# Patient Record
Sex: MALE | Race: WHITE | Employment: FULL TIME | URBAN - METROPOLITAN AREA
[De-identification: names, ages, dates, MRNs, and addresses within clinical notes are randomized per-mention and may not be internally consistent; named-entity substitution may affect disease eponyms.]

---

## 2021-11-01 ENCOUNTER — APPOINTMENT (OUTPATIENT)
Dept: CT IMAGING | Age: 51
End: 2021-11-01
Attending: EMERGENCY MEDICINE
Payer: COMMERCIAL

## 2021-11-01 ENCOUNTER — HOSPITAL ENCOUNTER (OUTPATIENT)
Age: 51
Setting detail: OBSERVATION
Discharge: HOME | End: 2021-11-03
Attending: EMERGENCY MEDICINE | Admitting: INTERNAL MEDICINE
Payer: COMMERCIAL

## 2021-11-01 DIAGNOSIS — I95.9 HYPOTENSION, UNSPECIFIED HYPOTENSION TYPE: ICD-10-CM

## 2021-11-01 DIAGNOSIS — A41.9 SEPSIS, DUE TO UNSPECIFIED ORGANISM, UNSPECIFIED WHETHER ACUTE ORGAN DYSFUNCTION PRESENT (HCC): ICD-10-CM

## 2021-11-01 DIAGNOSIS — K52.9 ENTERITIS: Primary | ICD-10-CM

## 2021-11-01 PROBLEM — R42 LIGHTHEADEDNESS: Status: ACTIVE | Noted: 2021-11-01

## 2021-11-01 PROBLEM — R19.7 DIARRHEA: Status: ACTIVE | Noted: 2021-11-01

## 2021-11-01 PROBLEM — R10.9 ACUTE ABDOMINAL PAIN: Status: ACTIVE | Noted: 2021-11-01

## 2021-11-01 PROBLEM — D72.829 LEUKOCYTOSIS: Status: ACTIVE | Noted: 2021-11-01

## 2021-11-01 PROBLEM — R00.0 TACHYCARDIA: Status: ACTIVE | Noted: 2021-11-01

## 2021-11-01 LAB
ABO + RH BLD: NORMAL
ALBUMIN SERPL-MCNC: 3.7 G/DL (ref 3.5–5)
ALBUMIN SERPL-MCNC: 3.8 G/DL (ref 3.5–5)
ALBUMIN/GLOB SERPL: 1.1 {RATIO} (ref 1.1–2.2)
ALBUMIN/GLOB SERPL: 1.2 {RATIO} (ref 1.1–2.2)
ALP SERPL-CCNC: 51 U/L (ref 45–117)
ALP SERPL-CCNC: 53 U/L (ref 45–117)
ALT SERPL-CCNC: 32 U/L (ref 12–78)
ALT SERPL-CCNC: 32 U/L (ref 12–78)
AMPHET UR QL SCN: NEGATIVE
ANION GAP SERPL CALC-SCNC: 9 MMOL/L (ref 5–15)
APPEARANCE UR: CLEAR
AST SERPL-CCNC: 14 U/L (ref 15–37)
AST SERPL-CCNC: 19 U/L (ref 15–37)
ATRIAL RATE: 88 BPM
BACTERIA URNS QL MICRO: NEGATIVE /HPF
BARBITURATES UR QL SCN: NEGATIVE
BASOPHILS # BLD: 0 K/UL (ref 0–0.1)
BASOPHILS NFR BLD: 0 % (ref 0–1)
BENZODIAZ UR QL: NEGATIVE
BILIRUB DIRECT SERPL-MCNC: 0.2 MG/DL (ref 0–0.2)
BILIRUB SERPL-MCNC: 0.5 MG/DL (ref 0.2–1)
BILIRUB SERPL-MCNC: 0.6 MG/DL (ref 0.2–1)
BILIRUB UR QL: NEGATIVE
BLOOD GROUP ANTIBODIES SERPL: NORMAL
BUN SERPL-MCNC: 16 MG/DL (ref 6–20)
BUN/CREAT SERPL: 8 (ref 12–20)
CALCIUM SERPL-MCNC: 9 MG/DL (ref 8.5–10.1)
CALCULATED P AXIS, ECG09: 67 DEGREES
CALCULATED R AXIS, ECG10: 56 DEGREES
CALCULATED T AXIS, ECG11: 74 DEGREES
CANNABINOIDS UR QL SCN: NEGATIVE
CHLORIDE SERPL-SCNC: 102 MMOL/L (ref 97–108)
CO2 SERPL-SCNC: 24 MMOL/L (ref 21–32)
COCAINE UR QL SCN: NEGATIVE
COLOR UR: ABNORMAL
CREAT SERPL-MCNC: 1.9 MG/DL (ref 0.7–1.3)
DIAGNOSIS, 93000: NORMAL
DIFFERENTIAL METHOD BLD: ABNORMAL
DRUG SCRN COMMENT,DRGCM: NORMAL
EOSINOPHIL # BLD: 0 K/UL (ref 0–0.4)
EOSINOPHIL NFR BLD: 0 % (ref 0–7)
EPITH CASTS URNS QL MICRO: ABNORMAL /LPF
ERYTHROCYTE [DISTWIDTH] IN BLOOD BY AUTOMATED COUNT: 13 % (ref 11.5–14.5)
ETHANOL SERPL-MCNC: <10 MG/DL
GLOBULIN SER CALC-MCNC: 3.2 G/DL (ref 2–4)
GLOBULIN SER CALC-MCNC: 3.3 G/DL (ref 2–4)
GLUCOSE SERPL-MCNC: 153 MG/DL (ref 65–100)
GLUCOSE UR STRIP.AUTO-MCNC: NEGATIVE MG/DL
HCT VFR BLD AUTO: 46.2 % (ref 36.6–50.3)
HEMOCCULT STL QL: NEGATIVE
HGB BLD-MCNC: 15.6 G/DL (ref 12.1–17)
HGB UR QL STRIP: NEGATIVE
IMM GRANULOCYTES # BLD AUTO: 0.2 K/UL (ref 0–0.04)
IMM GRANULOCYTES NFR BLD AUTO: 1 % (ref 0–0.5)
INR PPP: 1 (ref 0.9–1.1)
KETONES UR QL STRIP.AUTO: 15 MG/DL
LACTATE SERPL-SCNC: 1.1 MMOL/L (ref 0.4–2)
LACTATE SERPL-SCNC: 2.1 MMOL/L (ref 0.4–2)
LEUKOCYTE ESTERASE UR QL STRIP.AUTO: NEGATIVE
LIPASE SERPL-CCNC: 117 U/L (ref 73–393)
LYMPHOCYTES # BLD: 0.8 K/UL (ref 0.8–3.5)
LYMPHOCYTES NFR BLD: 4 % (ref 12–49)
MAGNESIUM SERPL-MCNC: 2.1 MG/DL (ref 1.6–2.4)
MCH RBC QN AUTO: 32.6 PG (ref 26–34)
MCHC RBC AUTO-ENTMCNC: 33.8 G/DL (ref 30–36.5)
MCV RBC AUTO: 96.5 FL (ref 80–99)
METHADONE UR QL: NEGATIVE
MONOCYTES # BLD: 1 K/UL (ref 0–1)
MONOCYTES NFR BLD: 5 % (ref 5–13)
NEUTS SEG # BLD: 17.5 K/UL (ref 1.8–8)
NEUTS SEG NFR BLD: 90 % (ref 32–75)
NITRITE UR QL STRIP.AUTO: NEGATIVE
NRBC # BLD: 0 K/UL (ref 0–0.01)
NRBC BLD-RTO: 0 PER 100 WBC
OPIATES UR QL: NEGATIVE
P-R INTERVAL, ECG05: 126 MS
PCP UR QL: NEGATIVE
PH UR STRIP: 5.5 [PH] (ref 5–8)
PLATELET # BLD AUTO: 276 K/UL (ref 150–400)
PLATELET COMMENTS,PCOM: ABNORMAL
PMV BLD AUTO: 10.5 FL (ref 8.9–12.9)
POTASSIUM SERPL-SCNC: 4.7 MMOL/L (ref 3.5–5.1)
PROT SERPL-MCNC: 7 G/DL (ref 6.4–8.2)
PROT SERPL-MCNC: 7 G/DL (ref 6.4–8.2)
PROT UR STRIP-MCNC: NEGATIVE MG/DL
PROTHROMBIN TIME: 10.3 SEC (ref 9–11.1)
Q-T INTERVAL, ECG07: 346 MS
QRS DURATION, ECG06: 74 MS
QTC CALCULATION (BEZET), ECG08: 418 MS
RBC # BLD AUTO: 4.79 M/UL (ref 4.1–5.7)
RBC #/AREA URNS HPF: ABNORMAL /HPF (ref 0–5)
RBC MORPH BLD: ABNORMAL
SODIUM SERPL-SCNC: 135 MMOL/L (ref 136–145)
SODIUM UR-SCNC: 106 MMOL/L
SP GR UR REFRACTOMETRY: 1.02 (ref 1–1.03)
SPECIMEN EXP DATE BLD: NORMAL
TROPONIN-HIGH SENSITIVITY: 7 NG/L (ref 0–76)
UA: UC IF INDICATED,UAUC: ABNORMAL
UROBILINOGEN UR QL STRIP.AUTO: 0.2 EU/DL (ref 0.2–1)
VENTRICULAR RATE, ECG03: 88 BPM
WBC # BLD AUTO: 19.5 K/UL (ref 4.1–11.1)
WBC URNS QL MICRO: ABNORMAL /HPF (ref 0–4)

## 2021-11-01 PROCEDURE — 65660000000 HC RM CCU STEPDOWN

## 2021-11-01 PROCEDURE — 74011250636 HC RX REV CODE- 250/636: Performed by: EMERGENCY MEDICINE

## 2021-11-01 PROCEDURE — 93005 ELECTROCARDIOGRAM TRACING: CPT

## 2021-11-01 PROCEDURE — 82272 OCCULT BLD FECES 1-3 TESTS: CPT

## 2021-11-01 PROCEDURE — G0378 HOSPITAL OBSERVATION PER HR: HCPCS

## 2021-11-01 PROCEDURE — 81001 URINALYSIS AUTO W/SCOPE: CPT

## 2021-11-01 PROCEDURE — 84300 ASSAY OF URINE SODIUM: CPT

## 2021-11-01 PROCEDURE — C9113 INJ PANTOPRAZOLE SODIUM, VIA: HCPCS | Performed by: INTERNAL MEDICINE

## 2021-11-01 PROCEDURE — 82077 ASSAY SPEC XCP UR&BREATH IA: CPT

## 2021-11-01 PROCEDURE — 85025 COMPLETE CBC W/AUTO DIFF WBC: CPT

## 2021-11-01 PROCEDURE — 80076 HEPATIC FUNCTION PANEL: CPT

## 2021-11-01 PROCEDURE — 96374 THER/PROPH/DIAG INJ IV PUSH: CPT

## 2021-11-01 PROCEDURE — 80053 COMPREHEN METABOLIC PANEL: CPT

## 2021-11-01 PROCEDURE — 96375 TX/PRO/DX INJ NEW DRUG ADDON: CPT

## 2021-11-01 PROCEDURE — 84484 ASSAY OF TROPONIN QUANT: CPT

## 2021-11-01 PROCEDURE — 83690 ASSAY OF LIPASE: CPT

## 2021-11-01 PROCEDURE — 96361 HYDRATE IV INFUSION ADD-ON: CPT

## 2021-11-01 PROCEDURE — 74176 CT ABD & PELVIS W/O CONTRAST: CPT

## 2021-11-01 PROCEDURE — 80307 DRUG TEST PRSMV CHEM ANLYZR: CPT

## 2021-11-01 PROCEDURE — 99285 EMERGENCY DEPT VISIT HI MDM: CPT

## 2021-11-01 PROCEDURE — 74011250636 HC RX REV CODE- 250/636: Performed by: INTERNAL MEDICINE

## 2021-11-01 PROCEDURE — 83605 ASSAY OF LACTIC ACID: CPT

## 2021-11-01 PROCEDURE — 87040 BLOOD CULTURE FOR BACTERIA: CPT

## 2021-11-01 PROCEDURE — 85610 PROTHROMBIN TIME: CPT

## 2021-11-01 PROCEDURE — 36415 COLL VENOUS BLD VENIPUNCTURE: CPT

## 2021-11-01 PROCEDURE — 83735 ASSAY OF MAGNESIUM: CPT

## 2021-11-01 PROCEDURE — 86900 BLOOD TYPING SEROLOGIC ABO: CPT

## 2021-11-01 RX ORDER — ATORVASTATIN CALCIUM 40 MG/1
40 TABLET, FILM COATED ORAL DAILY
COMMUNITY

## 2021-11-01 RX ORDER — SODIUM CHLORIDE 0.9 % (FLUSH) 0.9 %
5-40 SYRINGE (ML) INJECTION AS NEEDED
Status: DISCONTINUED | OUTPATIENT
Start: 2021-11-01 | End: 2021-11-03 | Stop reason: HOSPADM

## 2021-11-01 RX ORDER — ONDANSETRON 4 MG/1
4 TABLET, ORALLY DISINTEGRATING ORAL
Status: DISCONTINUED | OUTPATIENT
Start: 2021-11-01 | End: 2021-11-03 | Stop reason: HOSPADM

## 2021-11-01 RX ORDER — LISINOPRIL AND HYDROCHLOROTHIAZIDE 12.5; 2 MG/1; MG/1
1 TABLET ORAL DAILY
COMMUNITY

## 2021-11-01 RX ORDER — METRONIDAZOLE 500 MG/100ML
500 INJECTION, SOLUTION INTRAVENOUS EVERY 12 HOURS
Status: DISCONTINUED | OUTPATIENT
Start: 2021-11-02 | End: 2021-11-03 | Stop reason: HOSPADM

## 2021-11-01 RX ORDER — ONDANSETRON 2 MG/ML
4 INJECTION INTRAMUSCULAR; INTRAVENOUS
Status: DISCONTINUED | OUTPATIENT
Start: 2021-11-01 | End: 2021-11-03 | Stop reason: HOSPADM

## 2021-11-01 RX ORDER — SULFAMETHOXAZOLE AND TRIMETHOPRIM 800; 160 MG/1; MG/1
1 TABLET ORAL 2 TIMES DAILY
COMMUNITY
End: 2021-11-03

## 2021-11-01 RX ORDER — SODIUM CHLORIDE 0.9 % (FLUSH) 0.9 %
5-40 SYRINGE (ML) INJECTION EVERY 8 HOURS
Status: DISCONTINUED | OUTPATIENT
Start: 2021-11-01 | End: 2021-11-03 | Stop reason: HOSPADM

## 2021-11-01 RX ORDER — ONDANSETRON 2 MG/ML
4 INJECTION INTRAMUSCULAR; INTRAVENOUS
Status: COMPLETED | OUTPATIENT
Start: 2021-11-01 | End: 2021-11-01

## 2021-11-01 RX ORDER — METRONIDAZOLE 500 MG/100ML
500 INJECTION, SOLUTION INTRAVENOUS
Status: COMPLETED | OUTPATIENT
Start: 2021-11-01 | End: 2021-11-01

## 2021-11-01 RX ORDER — ENOXAPARIN SODIUM 100 MG/ML
40 INJECTION SUBCUTANEOUS DAILY
Status: DISCONTINUED | OUTPATIENT
Start: 2021-11-02 | End: 2021-11-03 | Stop reason: HOSPADM

## 2021-11-01 RX ORDER — ACETAMINOPHEN 650 MG/1
650 SUPPOSITORY RECTAL
Status: DISCONTINUED | OUTPATIENT
Start: 2021-11-01 | End: 2021-11-03 | Stop reason: HOSPADM

## 2021-11-01 RX ORDER — LEVOFLOXACIN 5 MG/ML
500 INJECTION, SOLUTION INTRAVENOUS
Status: COMPLETED | OUTPATIENT
Start: 2021-11-01 | End: 2021-11-01

## 2021-11-01 RX ORDER — SODIUM CHLORIDE 9 MG/ML
125 INJECTION, SOLUTION INTRAVENOUS CONTINUOUS
Status: DISCONTINUED | OUTPATIENT
Start: 2021-11-01 | End: 2021-11-03 | Stop reason: HOSPADM

## 2021-11-01 RX ORDER — ACETAMINOPHEN 325 MG/1
650 TABLET ORAL
Status: DISCONTINUED | OUTPATIENT
Start: 2021-11-01 | End: 2021-11-03 | Stop reason: HOSPADM

## 2021-11-01 RX ORDER — LEVOFLOXACIN 5 MG/ML
500 INJECTION, SOLUTION INTRAVENOUS EVERY 24 HOURS
Status: DISCONTINUED | OUTPATIENT
Start: 2021-11-02 | End: 2021-11-02

## 2021-11-01 RX ORDER — POLYETHYLENE GLYCOL 3350 17 G/17G
17 POWDER, FOR SOLUTION ORAL DAILY PRN
Status: DISCONTINUED | OUTPATIENT
Start: 2021-11-01 | End: 2021-11-03 | Stop reason: HOSPADM

## 2021-11-01 RX ADMIN — SODIUM CHLORIDE 75 ML/HR: 9 INJECTION, SOLUTION INTRAVENOUS at 20:50

## 2021-11-01 RX ADMIN — Medication 10 ML: at 22:57

## 2021-11-01 RX ADMIN — ONDANSETRON HYDROCHLORIDE 4 MG: 2 SOLUTION INTRAMUSCULAR; INTRAVENOUS at 19:00

## 2021-11-01 RX ADMIN — LEVOFLOXACIN 500 MG: 500 INJECTION, SOLUTION INTRAVENOUS at 19:20

## 2021-11-01 RX ADMIN — METRONIDAZOLE 500 MG: 500 INJECTION, SOLUTION INTRAVENOUS at 19:39

## 2021-11-01 RX ADMIN — SODIUM CHLORIDE 1000 ML: 900 INJECTION, SOLUTION INTRAVENOUS at 18:10

## 2021-11-01 RX ADMIN — SODIUM CHLORIDE 1000 ML: 9 INJECTION, SOLUTION INTRAVENOUS at 16:16

## 2021-11-01 RX ADMIN — PANTOPRAZOLE SODIUM 40 MG: 40 INJECTION, POWDER, FOR SOLUTION INTRAVENOUS at 22:57

## 2021-11-01 RX ADMIN — SODIUM CHLORIDE 1000 ML: 9 INJECTION, SOLUTION INTRAVENOUS at 20:47

## 2021-11-01 NOTE — ED TRIAGE NOTES
Patient reports vomiting, diarrhea with dizziness started today. Per wife patient looked like he will pass out. Patient had 2 episodes of syncope in February and January. Patient is taking antibiotic for Natrexone implant. Patient is A/O x4 in Triage.

## 2021-11-01 NOTE — ED PROVIDER NOTES
52-year-old male with a history of hypertension and alcoholism who has successfully completed rehab and has subcutaneous naltrexone in the right lower quadrant of his abdomen. Presents to the emergency department with nausea, vomiting, diarrhea, and dizziness that started today. Patient states that he did take all of his medications but has had a significant amount of vomiting and diarrhea today associated with a near syncopal event. The history is provided by the patient and the spouse. Abdominal Pain   This is a recurrent problem. The problem occurs constantly. The problem has been gradually worsening. The pain is associated with vomiting. Associated symptoms include diarrhea, nausea and vomiting. No past medical history on file. No past surgical history on file. No family history on file. Social History     Socioeconomic History    Marital status:      Spouse name: Not on file    Number of children: Not on file    Years of education: Not on file    Highest education level: Not on file   Occupational History    Not on file   Tobacco Use    Smoking status: Not on file   Substance and Sexual Activity    Alcohol use: Not on file    Drug use: Not on file    Sexual activity: Not on file   Other Topics Concern    Not on file   Social History Narrative    Not on file     Social Determinants of Health     Financial Resource Strain:     Difficulty of Paying Living Expenses:    Food Insecurity:     Worried About Running Out of Food in the Last Year:     [address] in the Last Year:    Transportation Needs:     Lack of Transportation (Medical):      Lack of Transportation (Non-Medical):    Physical Activity:     Days of Exercise per Week:     Minutes of Exercise per Session:    Stress:     Feeling of Stress :    Social Connections:     Frequency of Communication with Friends and Family:     Frequency of Social Gatherings with Friends and Family:     Attends [employer] Services:     Active Member of Clubs or Organizations:     Attends Club or Organization Meetings:     Marital Status:    Intimate Partner Violence:     Fear of Current or Ex-Partner:     Emotionally Abused:     Physically Abused:     Sexually Abused: ALLERGIES: Patient has no known allergies. Review of Systems   Gastrointestinal: Positive for abdominal pain, diarrhea, nausea and vomiting. Neurological: Positive for syncope and weakness. All other systems reviewed and are negative. Vitals:    11/01/21 1536 11/01/21 1546 11/01/21 1714 11/01/21 1715   BP: (!) 70/55 (!) 85/54  (!) 92/57   Pulse: (!) 106  90    Resp: 16  19    Temp: 97.5 °F (36.4 °C)      SpO2: 96%  100%    Weight: 60.1 kg (132 lb 7.9 oz)      Height: 5' 9\" (1.753 m)               Physical Exam  Vitals and nursing note reviewed. Constitutional:       Appearance: He is ill-appearing. HENT:      Head: Normocephalic and atraumatic. Eyes:      Pupils: Pupils are equal, round, and reactive to light. Cardiovascular:      Rate and Rhythm: Tachycardia present. Pulses: Normal pulses. Heart sounds: Normal heart sounds. Pulmonary:      Effort: Pulmonary effort is normal.      Breath sounds: Normal breath sounds. Abdominal:      Palpations: Abdomen is soft. Genitourinary:     Rectum: Normal.   Musculoskeletal:         General: Normal range of motion. Cervical back: Normal range of motion. Skin:     General: Skin is warm. Capillary Refill: Capillary refill takes 2 to 3 seconds. Coloration: Skin is pale. Neurological:      General: No focal deficit present. Mental Status: He is alert and oriented to person, place, and time. Psychiatric:         Mood and Affect: Mood normal.         Behavior: Behavior normal.         Thought Content:  Thought content normal.         Judgment: Judgment normal.          MDM  Number of Diagnoses or Management Options  Enteritis: new and requires workup  Hypotension, unspecified hypotension type: new and requires workup  Sepsis, due to unspecified organism, unspecified whether acute organ dysfunction present [facility]): new and requires workup     Amount and/or Complexity of Data Reviewed  Clinical lab tests: reviewed  Tests in the radiology section of CPT®: reviewed  Obtain history from someone other than the patient: yes  Discuss the patient with other providers: yes    Risk of Complications, Morbidity, and/or Mortality  Presenting problems: moderate  Diagnostic procedures: moderate  Management options: moderate    Patient Progress  Patient progress: stable         Procedures    Perfect Serve Consult for Admission  6:08 PM    ED Room Number: ER13/13  Patient Name and age: Zoe Chiu 49 y.o.  male  Working Diagnosis:   1. Enteritis    2. Hypotension, unspecified hypotension type        COVID-19 Suspicion:  no  Sepsis present:  no  Reassessment needed: yes  Code Status:  Full Code  Readmission: yes  Isolation Requirements:  no  Recommended Level of Care:  telemetry  Department:[facility] ED - [phone]  Other: 52-year-old male with a history of hypertension and alcoholism who has successfully completed rehab and has subcutaneous naltrexone in the right lower quadrant of his abdomen. Presents to the emergency department with nausea, vomiting, diarrhea, and dizziness that started today. Patient states that he did take all of his medications but has had a significant amount of vomiting and diarrhea today. He presented to the emergency department with fairly significant hypotension that has responded to 2 L of IV fluids 107/92. WBC is 19 and CT scan of abd/pelvis shows diffuse colitis.     LABORATORY TESTS:  Recent Results (from the past 12 hour(s))   EKG, 12 LEAD, INITIAL    Collection Time: 11/01/21  3:55 PM   Result Value Ref Range    Ventricular Rate 88 BPM    Atrial Rate 88 BPM    P-R Interval 126 ms    QRS Duration 74 ms    Q-T Interval 346 ms QTC Calculation (Bezet) 418 ms    Calculated P Axis 67 degrees    Calculated R Axis 56 degrees    Calculated T Axis 74 degrees    Diagnosis       Normal sinus rhythm  Possible Left atrial enlargement  Borderline ECG  No previous ECGs available  Confirmed by Jo Ervin MD. ([id number]) on 11/1/2021 10:13:21 PM     CBC WITH AUTOMATED DIFF    Collection Time: 11/01/21  4:23 PM   Result Value Ref Range    WBC 19.5 (H) 4.1 - 11.1 K/uL    RBC 4.79 4.10 - 5.70 M/uL    HGB 15.6 12.1 - 17.0 g/dL    HCT 46.2 36.6 - 50.3 %    MCV 96.5 80.0 - 99.0 FL    MCH 32.6 26.0 - 34.0 PG    MCHC 33.8 30.0 - 36.5 g/dL    RDW 13.0 11.5 - 14.5 %    PLATELET 994 622 - 369 K/uL    MPV 10.5 8.9 - 12.9 FL    NRBC 0.0 0  WBC    ABSOLUTE NRBC 0.00 0.00 - 0.01 K/uL    NEUTROPHILS 90 (H) 32 - 75 %    LYMPHOCYTES 4 (L) 12 - 49 %    MONOCYTES 5 5 - 13 %    EOSINOPHILS 0 0 - 7 %    BASOPHILS 0 0 - 1 %    IMMATURE GRANULOCYTES 1 (H) 0.0 - 0.5 %    ABS. NEUTROPHILS 17.5 (H) 1.8 - 8.0 K/UL    ABS. LYMPHOCYTES 0.8 0.8 - 3.5 K/UL    ABS. MONOCYTES 1.0 0.0 - 1.0 K/UL    ABS. EOSINOPHILS 0.0 0.0 - 0.4 K/UL    ABS. BASOPHILS 0.0 0.0 - 0.1 K/UL    ABS. IMM. GRANS. 0.2 (H) 0.00 - 0.04 K/UL    DF AUTOMATED      PLATELET COMMENTS Large Platelets      RBC COMMENTS NORMOCYTIC, NORMOCHROMIC     METABOLIC PANEL, COMPREHENSIVE    Collection Time: 11/01/21  4:23 PM   Result Value Ref Range    Sodium 135 (L) 136 - 145 mmol/L    Potassium 4.7 3.5 - 5.1 mmol/L    Chloride 102 97 - 108 mmol/L    CO2 24 21 - 32 mmol/L    Anion gap 9 5 - 15 mmol/L    Glucose 153 (H) 65 - 100 mg/dL    BUN 16 6 - 20 MG/DL    Creatinine 1.90 (H) 0.70 - 1.30 MG/DL    BUN/Creatinine ratio 8 (L) 12 - 20      GFR est AA 46 (L) >60 ml/min/1.73m2    GFR est non-AA 38 (L) >60 ml/min/1.73m2    Calcium 9.0 8.5 - 10.1 MG/DL    Bilirubin, total 0.5 0.2 - 1.0 MG/DL    ALT (SGPT) 32 12 - 78 U/L    AST (SGOT) 19 15 - 37 U/L    Alk.  phosphatase 51 45 - 117 U/L    Protein, total 7.0 6.4 - 8.2 g/dL Albumin 3.8 3.5 - 5.0 g/dL    Globulin 3.2 2.0 - 4.0 g/dL    A-G Ratio 1.2 1.1 - 2.2     TROPONIN-HIGH SENSITIVITY    Collection Time: 11/01/21  4:23 PM   Result Value Ref Range    Troponin-High Sensitivity 7 0 - 76 ng/L   TYPE & SCREEN    Collection Time: 11/01/21  4:23 PM   Result Value Ref Range    Crossmatch Expiration 11/04/2021,2359     ABO/Rh(D) Wendy Jimenez POSITIVE     Antibody screen NEG    PROTHROMBIN TIME + INR    Collection Time: 11/01/21  4:23 PM   Result Value Ref Range    INR 1.0 0.9 - 1.1      Prothrombin time 10.3 9.0 - 11.1 sec   ETHYL ALCOHOL    Collection Time: 11/01/21  4:23 PM   Result Value Ref Range    ALCOHOL(ETHYL),SERUM <10 <10 MG/DL   HEPATIC FUNCTION PANEL    Collection Time: 11/01/21  4:23 PM   Result Value Ref Range    Protein, total 7.0 6.4 - 8.2 g/dL    Albumin 3.7 3.5 - 5.0 g/dL    Globulin 3.3 2.0 - 4.0 g/dL    A-G Ratio 1.1 1.1 - 2.2      Bilirubin, total 0.6 0.2 - 1.0 MG/DL    Bilirubin, direct 0.2 0.0 - 0.2 MG/DL    Alk.  phosphatase 53 45 - 117 U/L    AST (SGOT) 14 (L) 15 - 37 U/L    ALT (SGPT) 32 12 - 78 U/L   LIPASE    Collection Time: 11/01/21  4:23 PM   Result Value Ref Range    Lipase 117 73 - 393 U/L   LACTIC ACID    Collection Time: 11/01/21  4:23 PM   Result Value Ref Range    Lactic acid 2.1 (HH) 0.4 - 2.0 MMOL/L   MAGNESIUM    Collection Time: 11/01/21  4:23 PM   Result Value Ref Range    Magnesium 2.1 1.6 - 2.4 mg/dL   OCCULT BLOOD, STOOL    Collection Time: 11/01/21  7:21 PM   Result Value Ref Range    Occult blood, stool Negative NEG     URINALYSIS W/ REFLEX CULTURE    Collection Time: 11/01/21  9:03 PM    Specimen: Urine   Result Value Ref Range    Color YELLOW/STRAW      Appearance CLEAR CLEAR      Specific gravity 1.016 1.003 - 1.030      pH (UA) 5.5 5.0 - 8.0      Protein Negative NEG mg/dL    Glucose Negative NEG mg/dL    Ketone 15 (A) NEG mg/dL    Bilirubin Negative NEG      Blood Negative NEG      Urobilinogen 0.2 0.2 - 1.0 EU/dL    Nitrites Negative NEG      Leukocyte Esterase Negative NEG      WBC 0-4 0 - 4 /hpf    RBC 0-5 0 - 5 /hpf    Epithelial cells FEW FEW /lpf    Bacteria Negative NEG /hpf    UA:UC IF INDICATED CULTURE NOT INDICATED BY UA RESULT CNI     DRUG SCREEN, URINE    Collection Time: 11/01/21  9:03 PM   Result Value Ref Range    AMPHETAMINES Negative NEG      BARBITURATES Negative NEG      BENZODIAZEPINES Negative NEG      COCAINE Negative NEG      METHADONE Negative NEG      OPIATES Negative NEG      PCP(PHENCYCLIDINE) Negative NEG      THC (TH-CANNABINOL) Negative NEG      Drug screen comment (NOTE)    LACTIC ACID    Collection Time: 11/01/21  9:08 PM   Result Value Ref Range    Lactic acid 1.1 0.4 - 2.0 MMOL/L       IMAGING RESULTS:   CT ABD PELV WO CONT   Final Result   Imaging findings consistent with a moderate to severe enteritis most dominant in   the right lower lobe. Consider infectious and inflammatory etiologies. There is   a small amount of ascites associated. No pneumatosis. Moderate hiatal hernia. Hepatic steatosis. Right basilar pulmonary nodule is small. Follow-up chest CT in 6 months to   evaluate for stability recommended. Incidental and/or nonemergent findings are as described in detail above.                 MEDICATIONS GIVEN:  Medications   sodium chloride (NS) flush 5-40 mL (has no administration in time range)   sodium chloride (NS) flush 5-40 mL (has no administration in time range)   acetaminophen (TYLENOL) tablet 650 mg (has no administration in time range)     Or   acetaminophen (TYLENOL) suppository 650 mg (has no administration in time range)   polyethylene glycol (MIRALAX) packet 17 g (has no administration in time range)   ondansetron (ZOFRAN ODT) tablet 4 mg (has no administration in time range)     Or   ondansetron (ZOFRAN) injection 4 mg (has no administration in time range)   enoxaparin (LOVENOX) injection 40 mg (has no administration in time range)   0.9% sodium chloride infusion (75 mL/hr IntraVENous New Bag 11/1/21 2050)   metroNIDAZOLE (FLAGYL) IVPB premix 500 mg (has no administration in time range)   levoFLOXacin (LEVAQUIN) 500 mg in D5W IVPB (has no administration in time range)   pantoprazole (PROTONIX) 40 mg in 0.9% sodium chloride 10 mL injection (has no administration in time range)   sodium chloride 0.9 % bolus infusion 1,000 mL (0 mL IntraVENous IV Completed 11/1/21 1826)   sodium chloride 0.9 % bolus infusion 1,000 mL (0 mL IntraVENous IV Completed 11/1/21 1912)   ondansetron (ZOFRAN) injection 4 mg (4 mg IntraVENous Given 11/1/21 1900)   metroNIDAZOLE (FLAGYL) IVPB premix 500 mg (500 mg IntraVENous New Bag 11/1/21 1939)   levoFLOXacin (LEVAQUIN) 500 mg in D5W IVPB (500 mg IntraVENous New Bag 11/1/21 1920)   sodium chloride 0.9 % bolus infusion 1,000 mL (1,000 mL IntraVENous New Bag 11/1/21 2047)       IMPRESSION:  1. Enteritis    2. Hypotension, unspecified hypotension type    3. Sepsis, due to unspecified organism, unspecified whether acute organ dysfunction present ([facility]        PLAN:  1. Admission  2.

## 2021-11-02 PROBLEM — R57.1 HYPOVOLEMIC SHOCK (HCC): Status: ACTIVE | Noted: 2021-11-02

## 2021-11-02 PROBLEM — E78.5 DYSLIPIDEMIA: Status: ACTIVE | Noted: 2021-11-02

## 2021-11-02 LAB
ALBUMIN SERPL-MCNC: 2.8 G/DL (ref 3.5–5)
ALBUMIN/GLOB SERPL: 1.1 {RATIO} (ref 1.1–2.2)
ALP SERPL-CCNC: 37 U/L (ref 45–117)
ALT SERPL-CCNC: 25 U/L (ref 12–78)
ANION GAP SERPL CALC-SCNC: 6 MMOL/L (ref 5–15)
AST SERPL-CCNC: 14 U/L (ref 15–37)
BASOPHILS # BLD: 0 K/UL (ref 0–0.1)
BASOPHILS NFR BLD: 0 % (ref 0–1)
BILIRUB SERPL-MCNC: 0.5 MG/DL (ref 0.2–1)
BUN SERPL-MCNC: 13 MG/DL (ref 6–20)
BUN/CREAT SERPL: 13 (ref 12–20)
CALCIUM SERPL-MCNC: 7.5 MG/DL (ref 8.5–10.1)
CHLORIDE SERPL-SCNC: 111 MMOL/L (ref 97–108)
CO2 SERPL-SCNC: 22 MMOL/L (ref 21–32)
CREAT SERPL-MCNC: 1 MG/DL (ref 0.7–1.3)
DIFFERENTIAL METHOD BLD: ABNORMAL
EOSINOPHIL # BLD: 0 K/UL (ref 0–0.4)
EOSINOPHIL NFR BLD: 0 % (ref 0–7)
ERYTHROCYTE [DISTWIDTH] IN BLOOD BY AUTOMATED COUNT: 13 % (ref 11.5–14.5)
GLOBULIN SER CALC-MCNC: 2.5 G/DL (ref 2–4)
GLUCOSE SERPL-MCNC: 108 MG/DL (ref 65–100)
HCT VFR BLD AUTO: 36.6 % (ref 36.6–50.3)
HGB BLD-MCNC: 12.2 G/DL (ref 12.1–17)
IMM GRANULOCYTES # BLD AUTO: 0 K/UL (ref 0–0.04)
IMM GRANULOCYTES NFR BLD AUTO: 0 % (ref 0–0.5)
LYMPHOCYTES # BLD: 1 K/UL (ref 0.8–3.5)
LYMPHOCYTES NFR BLD: 12 % (ref 12–49)
MAGNESIUM SERPL-MCNC: 1.9 MG/DL (ref 1.6–2.4)
MCH RBC QN AUTO: 32.5 PG (ref 26–34)
MCHC RBC AUTO-ENTMCNC: 33.3 G/DL (ref 30–36.5)
MCV RBC AUTO: 97.6 FL (ref 80–99)
MONOCYTES # BLD: 0.6 K/UL (ref 0–1)
MONOCYTES NFR BLD: 6 % (ref 5–13)
NEUTS SEG # BLD: 7.4 K/UL (ref 1.8–8)
NEUTS SEG NFR BLD: 82 % (ref 32–75)
NRBC # BLD: 0 K/UL (ref 0–0.01)
NRBC BLD-RTO: 0 PER 100 WBC
PLATELET # BLD AUTO: 202 K/UL (ref 150–400)
PMV BLD AUTO: 10.6 FL (ref 8.9–12.9)
POTASSIUM SERPL-SCNC: 4.4 MMOL/L (ref 3.5–5.1)
PROT SERPL-MCNC: 5.3 G/DL (ref 6.4–8.2)
RBC # BLD AUTO: 3.75 M/UL (ref 4.1–5.7)
SODIUM SERPL-SCNC: 139 MMOL/L (ref 136–145)
WBC # BLD AUTO: 9 K/UL (ref 4.1–11.1)

## 2021-11-02 PROCEDURE — 80053 COMPREHEN METABOLIC PANEL: CPT

## 2021-11-02 PROCEDURE — 83735 ASSAY OF MAGNESIUM: CPT

## 2021-11-02 PROCEDURE — 85025 COMPLETE CBC W/AUTO DIFF WBC: CPT

## 2021-11-02 PROCEDURE — 74011250637 HC RX REV CODE- 250/637: Performed by: INTERNAL MEDICINE

## 2021-11-02 PROCEDURE — 74011250636 HC RX REV CODE- 250/636: Performed by: INTERNAL MEDICINE

## 2021-11-02 PROCEDURE — 83516 IMMUNOASSAY NONANTIBODY: CPT

## 2021-11-02 PROCEDURE — 96376 TX/PRO/DX INJ SAME DRUG ADON: CPT

## 2021-11-02 PROCEDURE — 36415 COLL VENOUS BLD VENIPUNCTURE: CPT

## 2021-11-02 PROCEDURE — G0378 HOSPITAL OBSERVATION PER HR: HCPCS

## 2021-11-02 PROCEDURE — C9113 INJ PANTOPRAZOLE SODIUM, VIA: HCPCS | Performed by: INTERNAL MEDICINE

## 2021-11-02 PROCEDURE — 65270000029 HC RM PRIVATE

## 2021-11-02 RX ORDER — ATORVASTATIN CALCIUM 20 MG/1
40 TABLET, FILM COATED ORAL DAILY
Status: DISCONTINUED | OUTPATIENT
Start: 2021-11-02 | End: 2021-11-03 | Stop reason: HOSPADM

## 2021-11-02 RX ORDER — LEVOFLOXACIN 5 MG/ML
750 INJECTION, SOLUTION INTRAVENOUS EVERY 24 HOURS
Status: DISCONTINUED | OUTPATIENT
Start: 2021-11-02 | End: 2021-11-03 | Stop reason: HOSPADM

## 2021-11-02 RX ORDER — FOLIC ACID 1 MG/1
1 TABLET ORAL DAILY
Status: DISCONTINUED | OUTPATIENT
Start: 2021-11-02 | End: 2021-11-03 | Stop reason: HOSPADM

## 2021-11-02 RX ORDER — ASPIRIN 325 MG/1
100 TABLET, FILM COATED ORAL DAILY
Status: DISCONTINUED | OUTPATIENT
Start: 2021-11-02 | End: 2021-11-03 | Stop reason: HOSPADM

## 2021-11-02 RX ADMIN — SODIUM CHLORIDE 125 ML/HR: 9 INJECTION, SOLUTION INTRAVENOUS at 10:25

## 2021-11-02 RX ADMIN — Medication 10 ML: at 06:38

## 2021-11-02 RX ADMIN — FOLIC ACID 1 MG: 1 TABLET ORAL at 08:25

## 2021-11-02 RX ADMIN — PANTOPRAZOLE SODIUM 40 MG: 40 INJECTION, POWDER, FOR SOLUTION INTRAVENOUS at 20:22

## 2021-11-02 RX ADMIN — LEVOFLOXACIN 750 MG: 5 INJECTION, SOLUTION INTRAVENOUS at 11:39

## 2021-11-02 RX ADMIN — ATORVASTATIN CALCIUM 40 MG: 20 TABLET, FILM COATED ORAL at 08:25

## 2021-11-02 RX ADMIN — Medication 100 MG: at 08:25

## 2021-11-02 RX ADMIN — METRONIDAZOLE 500 MG: 500 INJECTION, SOLUTION INTRAVENOUS at 20:22

## 2021-11-02 RX ADMIN — Medication 10 ML: at 20:23

## 2021-11-02 RX ADMIN — METRONIDAZOLE 500 MG: 500 INJECTION, SOLUTION INTRAVENOUS at 08:25

## 2021-11-02 NOTE — PROGRESS NOTES
Bedside shift change report given to Cherie Avila (oncoming nurse) by Vj Reese RN (offgoing nurse). Report included the following information SBAR, Kardex, ED Summary, Intake/Output, and Recent Results. This patient was assisted with Intentional Toileting every 2 hours during this shift as appropriate. Documentation of ambulation and output reflected on Flowsheet as appropriate. Purposeful hourly rounding was completed using AIDET and 5Ps. Outcomes of PHR documented as they occurred. Bed alarm in use as appropriate. Dual Suction and ambubag in place. TRANSFER - OUT REPORT:    Verbal report given to Marie(name) on Juanito Single  being transferred to [address] Floor [address] (unit) for routine progression of care       Report consisted of patients Situation, Background, Assessment and   Recommendations(SBAR). Information from the following report(s) SBAR, Kardex, ED Summary, Intake/Output and Recent Results was reviewed with the receiving nurse. Lines:   Peripheral IV 11/01/21 Right Arm (Active)   Site Assessment Clean, dry, & intact 11/02/21 0825   Phlebitis Assessment 0 11/02/21 0825   Infiltration Assessment 0 11/02/21 0825   Dressing Status Clean, dry, & intact 11/02/21 0825   Dressing Type Transparent 11/02/21 0825   Hub Color/Line Status Blue;Capped 11/02/21 0825   Action Taken Open ports on tubing capped 11/02/21 0825   Alcohol Cap Used Yes 11/02/21 0825       Peripheral IV 11/01/21 Anterior; Left Antecubital (Active)   Site Assessment Clean, dry, & intact 11/02/21 0825   Phlebitis Assessment 0 11/02/21 0825   Infiltration Assessment 0 11/02/21 0825   Dressing Status Clean, dry, & intact 11/02/21 0825   Dressing Type Transparent 11/02/21 0825   Hub Color/Line Status Pink; Infusing 11/02/21 0825   Action Taken Open ports on tubing capped 11/02/21 0825   Alcohol Cap Used Yes 11/02/21 0825        Opportunity for questions and clarification was provided.       Patient transported with:   Registered Nurse  [facility]

## 2021-11-02 NOTE — PROGRESS NOTES
Bedside and Verbal shift change report given to [address] [place] [address] (oncoming nurse) by Ewa Saravia RN (offgoing nurse). Report included the following information SBAR, Kardex, MAR, Accordion and Recent Results.

## 2021-11-02 NOTE — PROGRESS NOTES
Reason for Admission:  enteritis                     RUR Score:   7%                  Plan for utilizing home health:      none    PCP: First and Last name:  Bhavana Manuel MD     Name of Practice:    Are you a current patient: Yes/No: yes   Approximate date of last visit:  2 months ago   Can you participate in a virtual visit with your PCP:  yes                    Current Advanced Directive/Advance Care Plan: Full Code  No AD; his wife is his next of kin    Healthcare Decision Maker:   Click here to [address] including selection of the Healthcare Decision Maker Relationship (ie \"Primary\")                             Transition of Care Plan:                    Pt lives with his wife in a 2 story house with their beagle. There are 3 entry steps and 12 interior stairs. Pt is independent with ADL's, drives and uses no DME. Medications are from [employer] on [address]. 1. Pt's wife to transport him home at d/c  2. Stool cultures sent to r/o c-diff  3. GI consult  4. Pt to f/u OP  5. CM following for any needs    Care Management Interventions  PCP Verified by CM: Yes  Mode of Transport at Discharge: Other (see comment)  Support Systems: Spouse/Significant Other  Confirm Follow Up Transport: Family  Discharge Location  Discharge Placement: Home with family assistance  GLORY Lakhani

## 2021-11-02 NOTE — CONSULTS
Cinthia Hodgkins, NP-C  [phone] cell     Gastroenterology Consultation Note      Admit Date: 11/1/2021  Consult Date: 11/2/2021   I greatly appreciate your asking me to see Bredna Koo, thank you very much for the opportunity to participate in his care. Narrative Assessment and Plan   50 y/o male admitted after abrupt onset lower abdominal discomfort accompanied by nausea, vomiting, and diarrhea. A couple of similar episodes previously. Completed rehab for alcohol abuse with IOP and naltrexone implant this past summer. Has completed 3 courses of Bactrim after implant. DEVAN at admission now corrected, WBC 19.5 but now 9. No known sick contacts. SS pending. CT with moderate-severe enteritis, small amount ascites, and moderate HH. Has never had an EGD or colonoscopy. Family history of PUD in his brother but no other GI disease. There is no history of recent ingestion of undercooked foods such as raw oysters, raw fish. There are no other persons ill at home. He states this is the 3rd episode this is happened to him and he is therefore anxious to obtain a diagnosis. · Follow for results of stool studies  · Continue abx for now  · Check celiac panel  · Will need colonoscopy +/- EGD, likely as OP rather than IP but will follow. He is now comfortable as opposed to having severe symptoms when presenting to the emergency room. He is able to retain a reasonable diet; he has not had hematemesis, melena, gross hematochezia. Subjective:     Chief Complaint: Abdominal Pain, Nausea, Vomiting and diarrhea, CT evidence enterocolitis    History of Present Illness: 50 y/o male admitted after abrupt onset lower abdominal discomfort accompanied by nausea, vomiting, and diarrhea. No fever or chills. No hematemesis or CGE. No rectal bleeding or melena. A couple of similar episodes previously. Completed rehab for alcohol abuse with IOP and naltrexone implant this past summer.  Has completed 3 courses of Bactrim after implant. No known sick contacts. Traveled to [place] about a month ago, no camping. No eating of foods questionably cooked. DEVAN at admission now corrected, SS pending. CT with moderate-severe enteritis, small amount ascites, and moderate HH. Pertinent labs: WBC 9 (19.5 at admission), hgb 12.2 (15 at admission), hct 36.6, MCV 97.6, platelets 198, sodium 135, potassium 4.7, BUN 16, creatinine 1.9 (now 1), lipase 117, heme negative stool, INR 1, LFTs normal. Had short course of NSAIDs about a month ago. No alcohol use. Does not smoke. Has never had an EGD or colonoscopy. Family history of PUD in his brother but no other GI disease. PCP:  Ava Kocher, MD    Past Medical History:   Diagnosis Date    Alcohol abuse     HLD (hyperlipidemia)     Hypertension         No past surgical history on file. Social History     Tobacco Use    Smoking status: Former Smoker    Smokeless tobacco: Never Used   Substance Use Topics    Alcohol use: Not Currently     Comment: no alochol for past few months         Family History   Problem Relation Age of Onset    No Known Problems Mother     Diabetes Father     Heart Disease Father         No Known Allergies         Home Medications:  Prior to Admission Medications   Prescriptions Last Dose Informant Patient Reported? Taking? OTHER,NON-FORMULARY, 10/1/2021 at Unknown time Self Yes Yes   Sig: Naltrexone implant   atorvastatin (LIPITOR) 40 mg tablet 10/31/2021 at Unknown time Self Yes Yes   Sig: Take 40 mg by mouth daily. lisinopril-hydroCHLOROthiazide (PRINZIDE, ZESTORETIC) 20-12.5 mg per tablet 10/31/2021 at Unknown time Self Yes Yes   Sig: Take 1 Tablet by mouth daily. trimethoprim-sulfamethoxazole (Bactrim DS) 160-800 mg per tablet 10/31/2021 at Unknown time Self Yes Yes   Sig: Take 1 Tablet by mouth two (2) times a day.       Facility-Administered Medications: None       Hospital Medications:  Current Facility-Administered Medications Medication Dose Route Frequency    levoFLOXacin (LEVAQUIN) 750 mg in D5W IVPB  750 mg IntraVENous Q24H    atorvastatin (LIPITOR) tablet 40 mg  40 mg Oral DAILY    folic acid (FOLVITE) tablet 1 mg  1 mg Oral DAILY    thiamine mononitrate (B-1) tablet 100 mg  100 mg Oral DAILY    sodium chloride (NS) flush 5-40 mL  5-40 mL IntraVENous Q8H    sodium chloride (NS) flush 5-40 mL  5-40 mL IntraVENous PRN    acetaminophen (TYLENOL) tablet 650 mg  650 mg Oral Q6H PRN    Or    acetaminophen (TYLENOL) suppository 650 mg  650 mg Rectal Q6H PRN    polyethylene glycol (MIRALAX) packet 17 g  17 g Oral DAILY PRN    ondansetron (ZOFRAN ODT) tablet 4 mg  4 mg Oral Q8H PRN    Or    ondansetron (ZOFRAN) injection 4 mg  4 mg IntraVENous Q6H PRN    enoxaparin (LOVENOX) injection 40 mg  40 mg SubCUTAneous DAILY    0.9% sodium chloride infusion  125 mL/hr IntraVENous CONTINUOUS    metroNIDAZOLE (FLAGYL) IVPB premix 500 mg  500 mg IntraVENous Q12H    pantoprazole (PROTONIX) 40 mg in 0.9% sodium chloride 10 mL injection  40 mg IntraVENous Q24H       Review of Systems: Per HPI, otherwise negative.       Objective:     Physical Exam:  Visit Vitals  /72 (BP 1 Location: Right upper arm, BP Patient Position: At rest)   Pulse 77   Temp 98.2 °F (36.8 °C)   Resp 17   Ht 5' 9\" (1.753 m)   Wt 69.3 kg (152 lb 12.8 oz)   SpO2 99%   BMI 22.56 kg/m²     SpO2 Readings from Last 6 Encounters:   11/02/21 99%            Intake/Output Summary (Last 24 hours) at 11/2/2021 1047  Last data filed at 11/2/2021 1044  Gross per 24 hour   Intake 2000 ml   Output 150 ml   Net 1850 ml      General: no distress, comfortable  Skin:  No rash or palpable dermatologic mass lesions  HEENT: Pupils equal, sclera anicteric, oropharynx with no gross lesions  Cardiovascular: No abnormal audible heart sounds, well perfused, no edema  Respiratory:  No abnormal audible breath sounds, normal respiratory effort, no throacic deformity  GI:  Abdomen nondistended, nontender, no mass, mild involuntary guarding  Musculoskeletal:  No skeletal deformity nor acute arthritis noted. Neurological:  Motor and sensory function intact in upper extremeties  Psychiatric:  Normal affect, memory intact, appears to have insight into current illness    Laboratory:    Recent Results (from the past 24 hour(s))   EKG, 12 LEAD, INITIAL    Collection Time: 11/01/21  3:55 PM   Result Value Ref Range    Ventricular Rate 88 BPM    Atrial Rate 88 BPM    P-R Interval 126 ms    QRS Duration 74 ms    Q-T Interval 346 ms    QTC Calculation (Bezet) 418 ms    Calculated P Axis 67 degrees    Calculated R Axis 56 degrees    Calculated T Axis 74 degrees    Diagnosis       Normal sinus rhythm  Possible Left atrial enlargement  Borderline ECG  No previous ECGs available  Confirmed by Jo Santo MD. ([id number]) on 11/1/2021 10:13:21 PM     CBC WITH AUTOMATED DIFF    Collection Time: 11/01/21  4:23 PM   Result Value Ref Range    WBC 19.5 (H) 4.1 - 11.1 K/uL    RBC 4.79 4.10 - 5.70 M/uL    HGB 15.6 12.1 - 17.0 g/dL    HCT 46.2 36.6 - 50.3 %    MCV 96.5 80.0 - 99.0 FL    MCH 32.6 26.0 - 34.0 PG    MCHC 33.8 30.0 - 36.5 g/dL    RDW 13.0 11.5 - 14.5 %    PLATELET 281 313 - 297 K/uL    MPV 10.5 8.9 - 12.9 FL    NRBC 0.0 0  WBC    ABSOLUTE NRBC 0.00 0.00 - 0.01 K/uL    NEUTROPHILS 90 (H) 32 - 75 %    LYMPHOCYTES 4 (L) 12 - 49 %    MONOCYTES 5 5 - 13 %    EOSINOPHILS 0 0 - 7 %    BASOPHILS 0 0 - 1 %    IMMATURE GRANULOCYTES 1 (H) 0.0 - 0.5 %    ABS. NEUTROPHILS 17.5 (H) 1.8 - 8.0 K/UL    ABS. LYMPHOCYTES 0.8 0.8 - 3.5 K/UL    ABS. MONOCYTES 1.0 0.0 - 1.0 K/UL    ABS. EOSINOPHILS 0.0 0.0 - 0.4 K/UL    ABS. BASOPHILS 0.0 0.0 - 0.1 K/UL    ABS. IMM.  GRANS. 0.2 (H) 0.00 - 0.04 K/UL    DF AUTOMATED      PLATELET COMMENTS Large Platelets      RBC COMMENTS NORMOCYTIC, NORMOCHROMIC     METABOLIC PANEL, COMPREHENSIVE    Collection Time: 11/01/21  4:23 PM   Result Value Ref Range    Sodium 135 (L) 136 - 145 mmol/L Potassium 4.7 3.5 - 5.1 mmol/L    Chloride 102 97 - 108 mmol/L    CO2 24 21 - 32 mmol/L    Anion gap 9 5 - 15 mmol/L    Glucose 153 (H) 65 - 100 mg/dL    BUN 16 6 - 20 MG/DL    Creatinine 1.90 (H) 0.70 - 1.30 MG/DL    BUN/Creatinine ratio 8 (L) 12 - 20      GFR est AA 46 (L) >60 ml/min/1.73m2    GFR est non-AA 38 (L) >60 ml/min/1.73m2    Calcium 9.0 8.5 - 10.1 MG/DL    Bilirubin, total 0.5 0.2 - 1.0 MG/DL    ALT (SGPT) 32 12 - 78 U/L    AST (SGOT) 19 15 - 37 U/L    Alk. phosphatase 51 45 - 117 U/L    Protein, total 7.0 6.4 - 8.2 g/dL    Albumin 3.8 3.5 - 5.0 g/dL    Globulin 3.2 2.0 - 4.0 g/dL    A-G Ratio 1.2 1.1 - 2.2     TROPONIN-HIGH SENSITIVITY    Collection Time: 11/01/21  4:23 PM   Result Value Ref Range    Troponin-High Sensitivity 7 0 - 76 ng/L   TYPE & SCREEN    Collection Time: 11/01/21  4:23 PM   Result Value Ref Range    Crossmatch Expiration 11/04/2021,2359     ABO/Rh(D) Lorraine Osuna POSITIVE     Antibody screen NEG    PROTHROMBIN TIME + INR    Collection Time: 11/01/21  4:23 PM   Result Value Ref Range    INR 1.0 0.9 - 1.1      Prothrombin time 10.3 9.0 - 11.1 sec   ETHYL ALCOHOL    Collection Time: 11/01/21  4:23 PM   Result Value Ref Range    ALCOHOL(ETHYL),SERUM <10 <10 MG/DL   HEPATIC FUNCTION PANEL    Collection Time: 11/01/21  4:23 PM   Result Value Ref Range    Protein, total 7.0 6.4 - 8.2 g/dL    Albumin 3.7 3.5 - 5.0 g/dL    Globulin 3.3 2.0 - 4.0 g/dL    A-G Ratio 1.1 1.1 - 2.2      Bilirubin, total 0.6 0.2 - 1.0 MG/DL    Bilirubin, direct 0.2 0.0 - 0.2 MG/DL    Alk.  phosphatase 53 45 - 117 U/L    AST (SGOT) 14 (L) 15 - 37 U/L    ALT (SGPT) 32 12 - 78 U/L   LIPASE    Collection Time: 11/01/21  4:23 PM   Result Value Ref Range    Lipase 117 73 - 393 U/L   LACTIC ACID    Collection Time: 11/01/21  4:23 PM   Result Value Ref Range    Lactic acid 2.1 (HH) 0.4 - 2.0 MMOL/L   MAGNESIUM    Collection Time: 11/01/21  4:23 PM   Result Value Ref Range    Magnesium 2.1 1.6 - 2.4 mg/dL   OCCULT BLOOD, STOOL Collection Time: 11/01/21  7:21 PM   Result Value Ref Range    Occult blood, stool Negative NEG     URINALYSIS W/ REFLEX CULTURE    Collection Time: 11/01/21  9:03 PM    Specimen: Urine   Result Value Ref Range    Color YELLOW/STRAW      Appearance CLEAR CLEAR      Specific gravity 1.016 1.003 - 1.030      pH (UA) 5.5 5.0 - 8.0      Protein Negative NEG mg/dL    Glucose Negative NEG mg/dL    Ketone 15 (A) NEG mg/dL    Bilirubin Negative NEG      Blood Negative NEG      Urobilinogen 0.2 0.2 - 1.0 EU/dL    Nitrites Negative NEG      Leukocyte Esterase Negative NEG      WBC 0-4 0 - 4 /hpf    RBC 0-5 0 - 5 /hpf    Epithelial cells FEW FEW /lpf    Bacteria Negative NEG /hpf    UA:UC IF INDICATED CULTURE NOT INDICATED BY UA RESULT CNI     SODIUM, UR, RANDOM    Collection Time: 11/01/21  9:03 PM   Result Value Ref Range    Sodium,urine random 106 MMOL/L   DRUG SCREEN, URINE    Collection Time: 11/01/21  9:03 PM   Result Value Ref Range    AMPHETAMINES Negative NEG      BARBITURATES Negative NEG      BENZODIAZEPINES Negative NEG      COCAINE Negative NEG      METHADONE Negative NEG      OPIATES Negative NEG      PCP(PHENCYCLIDINE) Negative NEG      THC (TH-CANNABINOL) Negative NEG      Drug screen comment (NOTE)    CULTURE, BLOOD    Collection Time: 11/01/21  9:03 PM    Specimen: Blood   Result Value Ref Range    Special Requests: NO SPECIAL REQUESTS      Culture result: NO GROWTH AFTER 6 HOURS     LACTIC ACID    Collection Time: 11/01/21  9:08 PM   Result Value Ref Range    Lactic acid 1.1 0.4 - 2.0 MMOL/L   METABOLIC PANEL, COMPREHENSIVE    Collection Time: 11/02/21 12:42 AM   Result Value Ref Range    Sodium 139 136 - 145 mmol/L    Potassium 4.4 3.5 - 5.1 mmol/L    Chloride 111 (H) 97 - 108 mmol/L    CO2 22 21 - 32 mmol/L    Anion gap 6 5 - 15 mmol/L    Glucose 108 (H) 65 - 100 mg/dL    BUN 13 6 - 20 MG/DL    Creatinine 1.00 0.70 - 1.30 MG/DL    BUN/Creatinine ratio 13 12 - 20      GFR est AA >60 >60 ml/min/1.73m2    GFR est non-AA >60 >60 ml/min/1.73m2    Calcium 7.5 (L) 8.5 - 10.1 MG/DL    Bilirubin, total 0.5 0.2 - 1.0 MG/DL    ALT (SGPT) 25 12 - 78 U/L    AST (SGOT) 14 (L) 15 - 37 U/L    Alk. phosphatase 37 (L) 45 - 117 U/L    Protein, total 5.3 (L) 6.4 - 8.2 g/dL    Albumin 2.8 (L) 3.5 - 5.0 g/dL    Globulin 2.5 2.0 - 4.0 g/dL    A-G Ratio 1.1 1.1 - 2.2     MAGNESIUM    Collection Time: 11/02/21 12:42 AM   Result Value Ref Range    Magnesium 1.9 1.6 - 2.4 mg/dL   CBC WITH AUTOMATED DIFF    Collection Time: 11/02/21 12:42 AM   Result Value Ref Range    WBC 9.0 4.1 - 11.1 K/uL    RBC 3.75 (L) 4.10 - 5.70 M/uL    HGB 12.2 12.1 - 17.0 g/dL    HCT 36.6 36.6 - 50.3 %    MCV 97.6 80.0 - 99.0 FL    MCH 32.5 26.0 - 34.0 PG    MCHC 33.3 30.0 - 36.5 g/dL    RDW 13.0 11.5 - 14.5 %    PLATELET 777 685 - 838 K/uL    MPV 10.6 8.9 - 12.9 FL    NRBC 0.0 0  WBC    ABSOLUTE NRBC 0.00 0.00 - 0.01 K/uL    NEUTROPHILS 82 (H) 32 - 75 %    LYMPHOCYTES 12 12 - 49 %    MONOCYTES 6 5 - 13 %    EOSINOPHILS 0 0 - 7 %    BASOPHILS 0 0 - 1 %    IMMATURE GRANULOCYTES 0 0.0 - 0.5 %    ABS. NEUTROPHILS 7.4 1.8 - 8.0 K/UL    ABS. LYMPHOCYTES 1.0 0.8 - 3.5 K/UL    ABS. MONOCYTES 0.6 0.0 - 1.0 K/UL    ABS. EOSINOPHILS 0.0 0.0 - 0.4 K/UL    ABS. BASOPHILS 0.0 0.0 - 0.1 K/UL    ABS. IMM. GRANS. 0.0 0.00 - 0.04 K/UL    DF AUTOMATED           Assessment/Plan:     Active Problems:    Enteritis (11/1/2021)      Acute abdominal pain (11/1/2021)      Diarrhea (11/1/2021)      Lightheadedness (11/1/2021)      Sepsis ([address] (11/1/2021)      Tachycardia (11/1/2021)      Leukocytosis (11/1/2021)      Dyslipidemia (11/2/2021)      Hypovolemic shock ([address] (11/2/2021)         See above narrative for full detail. Mago Moctezuma NP  Discussed with patient differential diagnosis of his symptoms, abnormal radiologic studies.   While he is anxious to find a diagnosis he does not wish to have endoscopic testing at this time but promises to return for that testing to obtain the diagnosis he wishes to have. Recurrent infectious disease is not common in the absence of consuming undercooked food, during exotic destinations or other unusual exposures. We'll obtain blood testing for hypercoagulable state. From my point of view he can be discharged home as he is now comfortable with a normal physical exam.    I have discussed with him endoscopic examination, preparation, potential complications; all questions answered and he confirms willingness to have the exam done in a timely fashion.     I have interviewed and examined patient with addendum to note above and formulation care plan to reflect my evaluation    Mikel Paredes M.D.

## 2021-11-02 NOTE — H&P
[facility] [place] [facility]  [address] [facility], [address] [place] [address]  [phone]    Admission History and Physical      NAME:  Alea Lewis   :   1970   MRN:  [id number]     PCP:  Roe Mayers MD     Date/Time of service:  2021  8:33 PM        Subjective:     CHIEF COMPLAINT: Vomiting    HISTORY OF PRESENT ILLNESS:     Mr. Henry Ang is a 49 y.o.  male with a past medical history of hypertension, hyperlipidemia and alcohol abuse with recent completion of rehab program this summer now with naltrexone implant who is admitted with sepsis and enteritis. Mr. Henry Ang states that this morning he began to experience lower quadrant abdominal pain, nausea vomiting, diarrhea and lightheadedness. He denies any fevers or chills, dysuria, chest pain, shortness of breath or syncope. He states that this past summer he completed alcohol rehab program which included naltrexone implants for which she is receiving Bactrim for prophylaxis. He states that he started Bactrim last week and today was supposed to be his last day. No Known Allergies    Prior to Admission medications    Not on File       Past Medical History:   Diagnosis Date    Alcohol abuse     HLD (hyperlipidemia)     Hypertension         No past surgical history on file.     Social History     Tobacco Use    Smoking status: Former Smoker    Smokeless tobacco: Never Used   Substance Use Topics    Alcohol use: Not Currently     Comment: no alochol for past few months         Family History   Problem Relation Age of Onset    No Known Problems Mother     Diabetes Father     Heart Disease Father         Review of Systems:  (bold if positive, if negative)    Gen:  Eyes:  ENT:  CVS:   dizzinessPulm:  GI:  Abdominal pain, nausea, emesis, diarrhea,:  MS:  Skin:  Psych:  Endo:  Hem:  Renal:  Neuro:            Objective:      VITALS:    Vital signs reviewed; most recent are:    Visit Vitals  /72   Pulse (!) 120   Temp 98.4 °F (36.9 °C)   Resp 23   Ht 5' 9\" (1.753 m)   Wt 60.1 kg (132 lb 7.9 oz)   SpO2 100%   BMI 19.57 kg/m²     SpO2 Readings from Last 6 Encounters:   11/01/21 100%            Intake/Output Summary (Last 24 hours) at 11/1/2021 2039  Last data filed at 11/1/2021 1912  Gross per 24 hour   Intake 2000 ml   Output    Net 2000 ml        Exam:     Physical Exam:    Gen:   in no acute distress  HEENT:  Pink conjunctivae, PERRL, hearing intact to voice  Resp:  No accessory muscle use, clear breath sounds without wheezes rales or rhonchi  Card:  RRR, No murmurs, normal S1, S2, no peripheral edema  Abd:  Soft, mild lower quadrant tenderness to palpation, non-distended, normoactive bowel sounds are present, naltrexone implant  Musc:  No cyanosis or clubbing  Skin: Naltrexone implant incision with stitches  Neuro:  Cranial nerves 3-12 are grossly intact, follows commands appropriately  Psych:  Oriented to person, place, and time, Alert with good insight      Labs:    Recent Labs     11/01/21  1623   WBC 19.5*   HGB 15.6   HCT 46.2        Recent Labs     11/01/21  1623   *   K 4.7      CO2 24   *   BUN 16   CREA 1.90*   CA 9.0   MG 2.1   ALB 3.8  3.7   TBILI 0.5  0.6   ALT 32  32     No results found for: GLUCPOC  No results for input(s): PH, PCO2, PO2, HCO3, FIO2 in the last 72 hours. Recent Labs     11/01/21  1623   INR 1.0       Radiology and EKG reviewed: CT with enteritis, hiatal hernia, pulmonary nodule     Old Records reviewed in [facility]       Assessment/Plan:        Sepsis ([facility] [address] (11/1/2021)/ Leukocytosis (11/1/2021)/ Tachycardia (11/1/2021): Meets criteria for sepsis with leukocytosis and tachycardia. Lactic acid 2.1 on presentation. Hypotensive with MAP < 65 on presentation; thus far reponding to IVF. Suspect abdominal source with enteritis. Obtain blood cultures. Obtain stool cultures. IVF. IV Levaquin and Flagyl. Consult GI.       Hypotension (11/1/2021)/   Lightheadedness (11/1/2021): Suspect due to intravascular volume depletion and sepsis. Continue IVF. Monitor. DEVAN: Suspect due to intervascular volume depletion/sepsis and Bactrim. No further Bactrim. Obtain UA and urine sodium. Obtain bladder scan. IVF. Monitor renal function. Enteritis (11/1/2021)/ Acute abdominal pain (11/1/2021)/ Diarrhea (11/1/2021)/ Intractable nausea vomiting: Plan as above. NO further bactrim. Obtain stool cultures. IVF. IV PPI. IV Levaquin and Flagyl. Consult GI. Hypertension: Hold home lisinopril-HCTZ due to hypotension. Hyperlipidemia: continue statin. History of alcohol abuse: Naltrexone implant in place. States has not used alcohol for the past few months. Folic acid and thiamine. Follow-up outpatient.        Risk of deterioration: high      Total time spent with patient: 48 Minutes **I personally saw and examined the patient during this time period**                 Care Plan discussed with: Patient and Nursing Staff    Discussed:  Code Status and Care Plan    Prophylaxis:  Lovenox    Probable Disposition:  Home w/Family           ___________________________________________________    Attending Physician: Dipak Bell DO

## 2021-11-02 NOTE — ED NOTES
TRANSFER - OUT REPORT:    Verbal report given to [facility] on George Dorsey  being transferred to [facility] for routine progression of care       Report consisted of patients Situation, Background, Assessment and   Recommendations(SBAR). Information from the following report(s) SBAR, ED Summary, [facility] and Recent Results was reviewed with the receiving nurse. Lines:   Peripheral IV 11/01/21 Right Arm (Active)   Site Assessment Clean, dry, & intact 11/01/21 1615   Dressing Status Clean, dry, & intact 11/01/21 1615   Hub Color/Line Status Blue; Infusing 11/01/21 1615   Action Taken Open ports on tubing capped 11/01/21 1615   Alcohol Cap Used Yes 11/01/21 1615       Peripheral IV 11/01/21 Anterior; Left Antecubital (Active)        Opportunity for questions and clarification was provided.       Patient transported with:   Registered Nurse

## 2021-11-02 NOTE — PROGRESS NOTES
[facility]: MED RECONCILIATION    Comments/Recommendations:     Patient able to confirm name, , allergies and preferred pharmacy  Pt has Naltrexone implant   Reports today was last day of bactrim       Information obtained from: Patient    **RxQuery data reflects medications filled and processed through the patient's insurance, however   this data does NOT capture whether the medication was picked, is currently being taken by the patient or if the patient is adherent to the medication in question. Allergies: Patient has no known allergies. Prior to Admission Medications:     Prior to Admission Medications   Prescriptions Last Dose Informant Patient Reported? Taking? OTHER,NON-FORMULARY, 10/1/2021 at Unknown time Self Yes Yes   Sig: Naltrexone implant   atorvastatin (LIPITOR) 40 mg tablet 10/31/2021 at Unknown time Self Yes Yes   Sig: Take 40 mg by mouth daily. lisinopril-hydroCHLOROthiazide (PRINZIDE, ZESTORETIC) 20-12.5 mg per tablet 10/31/2021 at Unknown time Self Yes Yes   Sig: Take 1 Tablet by mouth daily. trimethoprim-sulfamethoxazole (Bactrim DS) 160-800 mg per tablet 10/31/2021 at Unknown time Self Yes Yes   Sig: Take 1 Tablet by mouth two (2) times a day. Facility-Administered Medications: None         [employer] BOOKER    Clinical Staff Pharmacist  [address] [place] [address]  [phone]

## 2021-11-02 NOTE — PROGRESS NOTES
TRANSFER - IN REPORT:    Verbal report received from MARIBEL Snow on Parrish Michel  being received from ED for routine progression of care      Report consisted of patients Situation, Background, Assessment and   Recommendations(SBAR). Information from the following report(s) SBAR, Intake/Output, MAR, Recent Results, Med Rec Status and Cardiac Rhythm . was reviewed with the receiving nurse. Opportunity for questions and clarification was provided. Assessment completed upon patients arrival to unit and care assumed. This patient was assisted with intentional toileting every 2 hours during this shift as appropriate. Documentation of ambulation and output reflected on flow sheet as appropriate. Purposeful hourly rounding was completed using AIDET and 5 Ps. Outcomes of PHR documented as they occurred. Bed alarm in use as appropriate. Dual suction and ambubag in place. 0700 - Bedside and verbal shift change report given to Frieda Martinez RN (oncoming nurse) by Tran Mello RN (offgoing nurse). Report included the following information: SBAR, Kardex, Intake/Output, MAR, Recent Results, and Med Rec Status.

## 2021-11-02 NOTE — PROGRESS NOTES
[facility]ba [facility]so[facility] [place] [facility]  [facility] [place] [address] [place] [address]  [phone]      Medical Progress Note      NAME:         Yvrose Sanchez   :        1970  MRM:        [id number]    Date of service: 2021      Subjective: Patient has been seen and examined as a follow up for multiple medical issues. Chart, labs, diagnostics reviewed. Has not had any diarrhea. No nausea or vomiting. Mild abdominal discomfort. No fever or chills    Objective:    Vital Signs:    Visit Vitals  /74 (BP 1 Location: Right upper arm, BP Patient Position: At rest)   Pulse 82   Temp 98.1 °F (36.7 °C)   Resp 16   Ht 5' 9\" (1.753 m)   Wt 69.3 kg (152 lb 12.8 oz)   SpO2 99%   BMI 22.56 kg/m²          Intake/Output Summary (Last 24 hours) at 2021 1637  Last data filed at 2021 1511  Gross per 24 hour   Intake 2000 ml   Output 500 ml   Net 1500 ml        Physical Examination:    General:   Weak looking and not in any acute distress   Eyes:   pink conjunctivae, PERRLA with no discharge. ENT:   no ottorrhea or rhinorrhea with dry mucous membranes  Neck: no masses, thyroid non-tender and trachea central.  Pulm:  no accessory muscle use, clear breath sounds without crackles or wheezes  Card:  no JVD or murmurs, has regular and normal S1, S2 without thrills, bruits or peripheral edema  Abd:  Soft, mild abdomina discomfort, non-distended, normoactive bowel sounds   Musc:  No cyanosis, clubbing, atrophy or deformities. Skin:  No rashes, bruising or ulcers. Neuro: Awake and alert.  Generally a non focal exam. Follows commands appropriately  Psych:  Has a good insight and is oriented x 3    Current Facility-Administered Medications   Medication Dose Route Frequency    levoFLOXacin (LEVAQUIN) 750 mg in D5W IVPB  750 mg IntraVENous Q24H    atorvastatin (LIPITOR) tablet 40 mg  40 mg Oral DAILY    folic acid (FOLVITE) tablet 1 mg  1 mg Oral DAILY    thiamine mononitrate (B-1) tablet 100 mg  100 mg Oral DAILY    sodium chloride (NS) flush 5-40 mL  5-40 mL IntraVENous Q8H    sodium chloride (NS) flush 5-40 mL  5-40 mL IntraVENous PRN    acetaminophen (TYLENOL) tablet 650 mg  650 mg Oral Q6H PRN    Or    acetaminophen (TYLENOL) suppository 650 mg  650 mg Rectal Q6H PRN    polyethylene glycol (MIRALAX) packet 17 g  17 g Oral DAILY PRN    ondansetron (ZOFRAN ODT) tablet 4 mg  4 mg Oral Q8H PRN    Or    ondansetron (ZOFRAN) injection 4 mg  4 mg IntraVENous Q6H PRN    enoxaparin (LOVENOX) injection 40 mg  40 mg SubCUTAneous DAILY    0.9% sodium chloride infusion  125 mL/hr IntraVENous CONTINUOUS    metroNIDAZOLE (FLAGYL) IVPB premix 500 mg  500 mg IntraVENous Q12H    pantoprazole (PROTONIX) 40 mg in 0.9% sodium chloride 10 mL injection  40 mg IntraVENous Q24H        Laboratory data and review:    Recent Labs     11/02/21  0042 11/01/21  1623   WBC 9.0 19.5*   HGB 12.2 15.6   HCT 36.6 46.2    276     Recent Labs     11/02/21  0042 11/01/21  1623    135*   K 4.4 4.7   * 102   CO2 22 24   * 153*   BUN 13 16   CREA 1.00 1.90*   CA 7.5* 9.0   MG 1.9 2.1   ALB 2.8* 3.8  3.7   ALT 25 32  32   INR  --  1.0     No components found for: [place]    Diagnostics: Imaging studies have been reviewed    Telemetry reviewed by me:   normal sinus rhythm    Assessment and Plan:    Enteritis (11/1/2021) / Acute abdominal pain (11/1/2021) / Sepsis ([facility] [address] (11/1/2021) POA; admitted with 2/4 SIRS criteria. Lactate was normal. Clinically better. No stool samples for testing. Continue empiric IV Levofloxacin, Metronidazole. Start clears and ADAT to a regular diet. Gi following    Hypovolemic shock ([address] (11/2/2021) POA: this was likely due to volume depletion from Gi losses. Better. Continue IV fluids at an increased rate. Diet as tolerated    Acute Kidney Injury POA: due to pre-renal volume loss. SCr better. Continue IV fluids.  Monitor urine output and renal function    Hx Hypertension POA: hold all BP medications for now     Dyslipidemia POA: resume Crestor    Total time spent for the patient's care: [address] discussed with: Patient, Family and Nursing Staff    Discussed:  Care Plan and D/C Planning    Prophylaxis:  Lovenox    Anticipated Disposition:  Home w/Family           ___________________________________________________    Attending Physician:   Elsie Yuen MD

## 2021-11-03 VITALS
BODY MASS INDEX: 22.63 KG/M2 | WEIGHT: 152.8 LBS | SYSTOLIC BLOOD PRESSURE: 132 MMHG | DIASTOLIC BLOOD PRESSURE: 91 MMHG | OXYGEN SATURATION: 100 % | TEMPERATURE: 98 F | HEIGHT: 69 IN | HEART RATE: 71 BPM | RESPIRATION RATE: 16 BRPM

## 2021-11-03 PROBLEM — I10 HTN (HYPERTENSION), BENIGN: Status: ACTIVE | Noted: 2021-11-03

## 2021-11-03 LAB
ALBUMIN SERPL-MCNC: 2.9 G/DL (ref 3.5–5)
ALBUMIN/GLOB SERPL: 1 {RATIO} (ref 1.1–2.2)
ALP SERPL-CCNC: 35 U/L (ref 45–117)
ALT SERPL-CCNC: 21 U/L (ref 12–78)
ANION GAP SERPL CALC-SCNC: 3 MMOL/L (ref 5–15)
AST SERPL-CCNC: 13 U/L (ref 15–37)
BASOPHILS # BLD: 0 K/UL (ref 0–0.1)
BASOPHILS NFR BLD: 0 % (ref 0–1)
BILIRUB SERPL-MCNC: 0.4 MG/DL (ref 0.2–1)
BUN SERPL-MCNC: 8 MG/DL (ref 6–20)
BUN/CREAT SERPL: 10 (ref 12–20)
CALCIUM SERPL-MCNC: 8.3 MG/DL (ref 8.5–10.1)
CHLORIDE SERPL-SCNC: 107 MMOL/L (ref 97–108)
CO2 SERPL-SCNC: 28 MMOL/L (ref 21–32)
CREAT SERPL-MCNC: 0.8 MG/DL (ref 0.7–1.3)
DIFFERENTIAL METHOD BLD: ABNORMAL
EOSINOPHIL # BLD: 0.1 K/UL (ref 0–0.4)
EOSINOPHIL NFR BLD: 1 % (ref 0–7)
ERYTHROCYTE [DISTWIDTH] IN BLOOD BY AUTOMATED COUNT: 12.9 % (ref 11.5–14.5)
GLIADIN PEPTIDE IGA SER-ACNC: 4 UNITS (ref 0–19)
GLIADIN PEPTIDE IGG SER-ACNC: 2 UNITS (ref 0–19)
GLOBULIN SER CALC-MCNC: 2.9 G/DL (ref 2–4)
GLUCOSE SERPL-MCNC: 89 MG/DL (ref 65–100)
HCT VFR BLD AUTO: 36 % (ref 36.6–50.3)
HGB BLD-MCNC: 12 G/DL (ref 12.1–17)
IGA SERPL-MCNC: 192 MG/DL (ref 90–386)
IMM GRANULOCYTES # BLD AUTO: 0 K/UL (ref 0–0.04)
IMM GRANULOCYTES NFR BLD AUTO: 0 % (ref 0–0.5)
LYMPHOCYTES # BLD: 1.4 K/UL (ref 0.8–3.5)
LYMPHOCYTES NFR BLD: 25 % (ref 12–49)
MAGNESIUM SERPL-MCNC: 2 MG/DL (ref 1.6–2.4)
MCH RBC QN AUTO: 32.4 PG (ref 26–34)
MCHC RBC AUTO-ENTMCNC: 33.3 G/DL (ref 30–36.5)
MCV RBC AUTO: 97.3 FL (ref 80–99)
MONOCYTES # BLD: 0.4 K/UL (ref 0–1)
MONOCYTES NFR BLD: 7 % (ref 5–13)
NEUTS SEG # BLD: 3.8 K/UL (ref 1.8–8)
NEUTS SEG NFR BLD: 67 % (ref 32–75)
NRBC # BLD: 0 K/UL (ref 0–0.01)
NRBC BLD-RTO: 0 PER 100 WBC
PLATELET # BLD AUTO: 194 K/UL (ref 150–400)
PMV BLD AUTO: 10.8 FL (ref 8.9–12.9)
POTASSIUM SERPL-SCNC: 3.8 MMOL/L (ref 3.5–5.1)
PROT SERPL-MCNC: 5.8 G/DL (ref 6.4–8.2)
RBC # BLD AUTO: 3.7 M/UL (ref 4.1–5.7)
SODIUM SERPL-SCNC: 138 MMOL/L (ref 136–145)
TTG IGA SER-ACNC: <2 U/ML (ref 0–3)
TTG IGG SER-ACNC: 2 U/ML (ref 0–5)
WBC # BLD AUTO: 5.8 K/UL (ref 4.1–11.1)

## 2021-11-03 PROCEDURE — 74011250636 HC RX REV CODE- 250/636: Performed by: INTERNAL MEDICINE

## 2021-11-03 PROCEDURE — 85598 HEXAGNAL PHOSPH PLTLT NEUTRL: CPT

## 2021-11-03 PROCEDURE — 36415 COLL VENOUS BLD VENIPUNCTURE: CPT

## 2021-11-03 PROCEDURE — 74011250637 HC RX REV CODE- 250/637: Performed by: INTERNAL MEDICINE

## 2021-11-03 PROCEDURE — G0378 HOSPITAL OBSERVATION PER HR: HCPCS

## 2021-11-03 PROCEDURE — 80053 COMPREHEN METABOLIC PANEL: CPT

## 2021-11-03 PROCEDURE — 85306 CLOT INHIBIT PROT S FREE: CPT

## 2021-11-03 PROCEDURE — 81241 F5 GENE: CPT

## 2021-11-03 PROCEDURE — 96376 TX/PRO/DX INJ SAME DRUG ADON: CPT

## 2021-11-03 PROCEDURE — 85025 COMPLETE CBC W/AUTO DIFF WBC: CPT

## 2021-11-03 PROCEDURE — 85303 CLOT INHIBIT PROT C ACTIVITY: CPT

## 2021-11-03 PROCEDURE — 83735 ASSAY OF MAGNESIUM: CPT

## 2021-11-03 RX ORDER — LEVOFLOXACIN 750 MG/1
750 TABLET ORAL DAILY
Qty: 5 TABLET | Refills: 0 | Status: SHIPPED | OUTPATIENT
Start: 2021-11-03 | End: 2021-11-08

## 2021-11-03 RX ORDER — METRONIDAZOLE 500 MG/1
500 TABLET ORAL 3 TIMES DAILY
Qty: 15 TABLET | Refills: 0 | Status: SHIPPED | OUTPATIENT
Start: 2021-11-03 | End: 2021-11-08

## 2021-11-03 RX ADMIN — ATORVASTATIN CALCIUM 40 MG: 20 TABLET, FILM COATED ORAL at 08:59

## 2021-11-03 RX ADMIN — SODIUM CHLORIDE 125 ML/HR: 9 INJECTION, SOLUTION INTRAVENOUS at 05:01

## 2021-11-03 RX ADMIN — Medication 100 MG: at 09:00

## 2021-11-03 RX ADMIN — METRONIDAZOLE 500 MG: 500 INJECTION, SOLUTION INTRAVENOUS at 08:59

## 2021-11-03 RX ADMIN — Medication 10 ML: at 04:52

## 2021-11-03 RX ADMIN — FOLIC ACID 1 MG: 1 TABLET ORAL at 09:00

## 2021-11-03 NOTE — PROGRESS NOTES
Bedside, Verbal and Written shift change report given to Susie Bradshaw (oncoming nurse) by Audrey Mercer (offgoing nurse). Report included the following information SBAR, Kardex, ED Summary, Procedure Summary, Intake/Output, MAR, Recent Results and Med Rec Status.

## 2021-11-03 NOTE — PROGRESS NOTES
TRANSFER - IN REPORT:    Verbal report received from F[address] RN(name) on Michelle Rivas  being received from Saroj dozier RN(unit) for routine progression of care      Report consisted of patients Situation, Background, Assessment and   Recommendations(SBAR). Information from the following report(s) SBAR, Kardex, ED Summary, Procedure Summary, Intake/Output, MAR, Recent Results and Med Rec Status was reviewed with the receiving nurse. Opportunity for questions and clarification was provided. Assessment completed upon patients arrival to unit and care assumed.

## 2021-11-03 NOTE — PROGRESS NOTES
11/3/2021   CARE MANAGEMENT NOTE:  Pt transferred from [facility] to the 5th floor. CM reviewed EMR and handoff was received from previous  Keila Escobedo). Pt was admitted with enteritis. Reportedly, pt resides with his wife. RUR 5%    Transition Plan of Care:  1. Pt to return home with his wife; no need for homecare  2. Outpt f/u  3. Wife will transport pt home    No further post discharge needs indicated.   Goyo

## 2021-11-03 NOTE — DISCHARGE SUMMARY
Bubba Velasquez shane [address]ley [address]  [address] [facility], [address] [place] [address]  Tel: [phone]    Physician Discharge Summary    Patient ID:    Esha Tolbert  Age:              49 y.o.    : 1970  MRN:             [id number]     PCP: Ava Kocher, MD     Date of Admission: 2021    Date of Discharge:  11/3/2021    Principal admission Diagnosis:   Enteritis [K52.9]    Discharge Diagnoses: Active Problems:    Enteritis (2021)    Acute abdominal pain (2021)    Dyslipidemia (2021)    Hypertension      Hospital Course:     Mr. Nigel Muñoz is a 49 y.o. admitted to [facility] and treated for the following:    Enteritis (2021) / Acute abdominal pain (2021) / Sepsis ([facility] [address] (2021) POA: admitted with 2/4 SIRS criteria. Lactate was normal. He has improved markedly. No stool samples for testing. Seen by Gi who ordered for a celiac panel, factor 5 Leiden and a protein C activity, all of which are pending. Has been on empiric IV Levofloxacin, Metronidazole. Has tolerated his diet well. He will be discharged home in stable condition with an antibiotic course. Follow up with Gi     Hypovolemic shock ([address] (2021) POA: this was likely due to volume depletion from Gi losses. Has resolved. Given hx of HTN, will resume home medications     Acute Kidney Injury POA: due to pre-renal volume loss. SCr normal and this has resolved      Dyslipidemia POA: Continue Crestor    Discharge Exam:    Visit Vitals  BP (!) 132/91 (BP 1 Location: Left upper arm, BP Patient Position: At rest)   Pulse 71   Temp 98 °F (36.7 °C)   Resp 16   Ht 5' 9\" (1.753 m)   Wt 69.3 kg (152 lb 12.8 oz)   SpO2 100%   BMI 22.56 kg/m²        Patient has been seen and examined.     General: well looking and in no acute distress  Pulm: clear breath sounds without wheezes  Card: no murmurs, normal S1, S2 without thrills, bruits   Abd:    soft, non-tender, normoactive bowel sounds  Skin: no rashes and skin turgor is good  Neuro: awake, alert and has a non focal     Activity: Activity as tolerated    Diet: Regular Diet    Current Discharge Medication List      START taking these medications    Details   levoFLOXacin (LEVAQUIN) 750 mg tablet Take 1 Tablet by mouth daily for 5 days. Qty: 5 Tablet, Refills: 0      metroNIDAZOLE (FLAGYL) 500 mg tablet Take 1 Tablet by mouth three (3) times daily for 5 days. Qty: 15 Tablet, Refills: 0         CONTINUE these medications which have NOT CHANGED    Details   lisinopril-hydroCHLOROthiazide (PRINZIDE, ZESTORETIC) 20-12.5 mg per tablet Take 1 Tablet by mouth daily. atorvastatin (LIPITOR) 40 mg tablet Take 40 mg by mouth daily. OTHER,NON-FORMULARY, Naltrexone implant         STOP taking these medications       trimethoprim-sulfamethoxazole (Bactrim DS) 160-800 mg per tablet Comments:   Reason for Stopping: Follow-up Information     Follow up With Specialties Details Why Contact Info    Jamil Hammond MD Family Medicine Go to for the regular follow up [address]      Noel Worrell MD Gastroenterology Call to confirm follow up for review [address] [facility]  [phone]            Follow-up tests or labs: Celiac panel, hypercoag profile    Discharge Condition: Stable    Disposition: home    Time taken to arrange discharge:  35 minutes.     Signed:  MD [facility]  11/3/2021   8:17 AM

## 2021-11-03 NOTE — DISCHARGE INSTRUCTIONS
HOSPITALIST DISCHARGE INSTRUCTIONS    NAME: Nila Astudillo   :  1970   MRN:  [id number]     Date:     11/3/2021    ADMIT DATE: 2021     DISCHARGE DATE: 11/3/2021     PRINCIPAL ADMITTING DIAGNOSIS:  Enteritis [K52.9]    DISCHARGE DIAGNOSES:  Active Problems:    Enteritis (2021)    Acute abdominal pain (2021)    Leukocytosis (2021)    Dyslipidemia (2021)    MEDICATIONS:    · It is important that medications are taken exactly as they are prescribed on the discharge medication instructions and keep them your  in the bottles provided by the pharmacist.   · Keep a list of the medication names, dosages, and times to be taken at all times. · Do not take other medications without consulting your doctor. Recommended diet:  Regular Diet    Recommended activity: Activity as tolerated    Post discharge care:    Notify follow up health care provider or return to the emergency department if you cannot get hold of your doctor if you feel worse or experience symptoms similar to those that brought you to hospital    Follow-up Information     Follow up With Specialties Details Why Contact Info    Viky Saldana MD Family Medicine Go to for the regular follow up [address] [place] [address]  [phone]      Denis Webster MD Gastroenterology Call to confirm follow up for review [address] [facility]  [phone]            Information obtained by :  I understand that if any problems occur once I am at home I am to contact my physician and I understand and acknowledge receipt of the instructions indicated above.                                                                                                                                            Physician's or R.N.'s Signature                                                                  Date/Time Patient or Representative Signature                                                          Date/Time

## 2021-11-03 NOTE — PROGRESS NOTES
Discharge medications reviewed with patient and spouse and appropriate educational materials and side effects teaching were provided. I have reviewed discharge instructions with the patient and spouse. The patient and spouse verbalized understanding.  AVS signed and copy given to patient

## 2021-11-03 NOTE — PROGRESS NOTES
Progress Note  Date:11/3/2021       Room:[facility]  Patient Name:Iain Lawson     YOB: 1970     Age:51 y.o. Subjective    Subjective:  Symptoms:  Improved. Diet:  Adequate intake. No nausea or vomiting. Activity level: Normal.    Pain:  He reports no pain. Review of Systems   Constitutional: Negative for appetite change, fatigue and fever. Cardiovascular: Negative for leg swelling. Gastrointestinal: Negative for abdominal pain, blood in stool, nausea and vomiting. Objective         Vitals Last 24 Hours:  TEMPERATURE:  Temp  Av.2 °F (36.8 °C)  Min: 98 °F (36.7 °C)  Max: 98.6 °F (37 °C)  RESPIRATIONS RANGE: Resp  Av.5  Min: 16  Max: 17  PULSE OXIMETRY RANGE: SpO2  Av.2 %  Min: 97 %  Max: 100 %  PULSE RANGE: Pulse  Av  Min: 62  Max: 112  BLOOD PRESSURE RANGE: Systolic (05IVW), KYJ:632 , Min:111 , QPN:342   ; Diastolic (34ZRO), RWJ:31, Min:72, Max:91    I/O (24Hr): Intake/Output Summary (Last 24 hours) at 11/3/2021 0952  Last data filed at 11/3/2021 0501  Gross per 24 hour   Intake 3093.33 ml   Output 1125 ml   Net 1968.33 ml     Objective:  General Appearance:  Comfortable and not in pain. Vital signs: (most recent): Blood pressure (!) 132/91, pulse 71, temperature 98 °F (36.7 °C), resp. rate 16, height 5' 9\" (1.753 m), weight 69.3 kg (152 lb 12.8 oz), SpO2 100 %. No fever. Output: Producing stool. HEENT: Normal HEENT exam.    Lungs:  Normal effort and normal respiratory rate. Breath sounds clear to auscultation. Heart: Normal rate. Regular rhythm. S1 normal and S2 normal.  No murmur. Abdomen: Abdomen is soft and non-distended. Bowel sounds are normal.   There is no abdominal tenderness. Extremities: Normal range of motion. There is no dependent edema. Pulses: Distal pulses are intact. Neurological: Patient is alert and oriented to person, place and time.     Pupils:  Pupils are equal, round, and reactive to light. Skin:  Warm and dry. Labs/Imaging/Diagnostics    Labs:  CBC:  Recent Labs     11/03/21 0434 11/02/21 0042 11/01/21  1623   WBC 5.8 9.0 19.5*   RBC 3.70* 3.75* 4.79   HGB 12.0* 12.2 15.6   HCT 36.0* 36.6 46.2   MCV 97.3 97.6 96.5   RDW 12.9 13.0 13.0    202 276     CHEMISTRIES:  Recent Labs     11/03/21 0434 11/02/21 0042 11/01/21  1623    139 135*   K 3.8 4.4 4.7    111* 102   CO2 28 22 24   BUN 8 13 16   CA 8.3* 7.5* 9.0   MG 2.0 1.9 2.1   PT/INR:  Recent Labs     11/01/21  1623   INR 1.0     APTT:No results for input(s): APTT in the last 72 hours. LIVER PROFILE:  Recent Labs     11/03/21 0434 11/02/21 0042 11/01/21  1623   AST 13* 14* 19  14*   ALT 21 25 32  32     Lab Results   Component Value Date/Time    ALT (SGPT) 21 11/03/2021 04:34 AM    AST (SGOT) 13 (L) 11/03/2021 04:34 AM    Alk. phosphatase 35 (L) 11/03/2021 04:34 AM    Bilirubin, direct 0.2 11/01/2021 04:23 PM    Bilirubin, total 0.4 11/03/2021 04:34 AM       Imaging Last 24 Hours:  No results found. Assessment//Plan   Principal Problem:    Enteritis (11/1/2021)    Active Problems:    Acute abdominal pain (11/1/2021)      Diarrhea (11/1/2021)      Dyslipidemia (11/2/2021)      HTN (hypertension), benign (11/3/2021)      Assessment:  (54 y/o male admitted after abrupt onset lower abdominal discomfort accompanied by nausea, vomiting, and diarrhea. A couple of similar episodes previously. Completed rehab for alcohol abuse with IOP and naltrexone implant this past summer. Has completed 3 courses of Bactrim after implant. DEVAN at admission now corrected, WBC 19.5 but now 9. No known sick contacts. SS pending. CT with moderate-severe enteritis, small amount ascites, and moderate HH. Has never had an EGD or colonoscopy. Family history of PUD in his brother but no other GI disease. There is no history of recent ingestion of undercooked foods such as raw oysters, raw fish.   There are no other persons ill at home.  He states this is the 3rd episode this is happened to him and he is therefore anxious to obtain a diagnosis. 11/3/2021: Celiac panel pending. To be discharged later today. No further episodes of diarrhea. WBC normal, Hgb 12. No nausea or vomiting. Tolerating diet. ). Plan:   (Will arrange OP follow up at our [facility] which is closer to his home. Please call if needed further. ).        Electronically signed by Mago Moctezuma NP on 11/3/2021 at 9:52 AM    I have interviewed and examined patient with addendum to note above and formulation care plan to reflect my evaluation    Paola Dunlap M.D.

## 2021-11-06 LAB
PROT C PPP-ACNC: 106 % (ref 73–180)
PROT S ACT/NOR PPP: 108 % (ref 63–140)
PROT S AG ACT/NOR PPP IA: 72 % (ref 60–150)
PROT S FREE AG ACT/NOR PPP IA: 118 % (ref 61–136)

## 2021-11-07 LAB
BACTERIA SPEC CULT: NORMAL
SERVICE CMNT-IMP: NORMAL

## 2021-11-09 LAB — F5 GENE MUT ANL BLD/T: NORMAL

## 2021-11-23 LAB
APTT HEX PL PPP: 4 SEC
APTT IMM NP PPP: NORMAL SEC
APTT PPP 1:1 SALINE: NORMAL SEC
APTT PPP: 26.1 SEC
B2 GLYCOPROT1 IGA SER-ACNC: <10 SAU
B2 GLYCOPROT1 IGG SER-ACNC: <10 SGU
B2 GLYCOPROT1 IGM SER-ACNC: <10 SMU
CARDIOLIPIN IGG SER IA-ACNC: <10 GPL
CARDIOLIPIN IGM SER IA-ACNC: <10 MPL
CONFIRM DRVVT: NORMAL SEC
INR PPP: 1 RATIO
LAC INTERPRETATION, 502038: NORMAL
PROTHROMBIN TIME 500372: 10 SEC
SCREEN DRVVT/NORMAL: NORMAL RATIO
SCREEN DRVVT: 33.9 SEC
THROMBIN TIME: 16.3 SEC

## 2021-11-24 ENCOUNTER — HOSPITAL ENCOUNTER (EMERGENCY)
Age: 51
Discharge: HOME | End: 2021-11-24
Attending: STUDENT IN AN ORGANIZED HEALTH CARE EDUCATION/TRAINING PROGRAM
Payer: COMMERCIAL

## 2021-11-24 VITALS
DIASTOLIC BLOOD PRESSURE: 79 MMHG | OXYGEN SATURATION: 98 % | RESPIRATION RATE: 15 BRPM | TEMPERATURE: 97.2 F | HEART RATE: 89 BPM | SYSTOLIC BLOOD PRESSURE: 110 MMHG

## 2021-11-24 DIAGNOSIS — R11.2 NON-INTRACTABLE VOMITING WITH NAUSEA, UNSPECIFIED VOMITING TYPE: Primary | ICD-10-CM

## 2021-11-24 DIAGNOSIS — D72.829 LEUKOCYTOSIS, UNSPECIFIED TYPE: ICD-10-CM

## 2021-11-24 LAB
ALBUMIN SERPL-MCNC: 4.2 G/DL (ref 3.5–5)
ALBUMIN/GLOB SERPL: 1.1 {RATIO} (ref 1.1–2.2)
ALP SERPL-CCNC: 62 U/L (ref 45–117)
ALT SERPL-CCNC: 46 U/L (ref 12–78)
ANION GAP SERPL CALC-SCNC: 7 MMOL/L (ref 5–15)
AST SERPL-CCNC: 27 U/L (ref 15–37)
ATRIAL RATE: 88 BPM
BASOPHILS # BLD: 0 K/UL (ref 0–0.1)
BASOPHILS NFR BLD: 0 % (ref 0–1)
BILIRUB SERPL-MCNC: 1.1 MG/DL (ref 0.2–1)
BUN SERPL-MCNC: 16 MG/DL (ref 6–20)
BUN/CREAT SERPL: 16 (ref 12–20)
CALCIUM SERPL-MCNC: 9.4 MG/DL (ref 8.5–10.1)
CALCULATED P AXIS, ECG09: 61 DEGREES
CALCULATED R AXIS, ECG10: 30 DEGREES
CALCULATED T AXIS, ECG11: 64 DEGREES
CHLORIDE SERPL-SCNC: 102 MMOL/L (ref 97–108)
CO2 SERPL-SCNC: 30 MMOL/L (ref 21–32)
COMMENT, HOLDF: NORMAL
CREAT SERPL-MCNC: 0.99 MG/DL (ref 0.7–1.3)
DIAGNOSIS, 93000: NORMAL
DIFFERENTIAL METHOD BLD: ABNORMAL
EOSINOPHIL # BLD: 0 K/UL (ref 0–0.4)
EOSINOPHIL NFR BLD: 0 % (ref 0–7)
ERYTHROCYTE [DISTWIDTH] IN BLOOD BY AUTOMATED COUNT: 12.3 % (ref 11.5–14.5)
GLOBULIN SER CALC-MCNC: 3.8 G/DL (ref 2–4)
GLUCOSE SERPL-MCNC: 140 MG/DL (ref 65–100)
HCT VFR BLD AUTO: 48 % (ref 36.6–50.3)
HGB BLD-MCNC: 16.2 G/DL (ref 12.1–17)
IMM GRANULOCYTES # BLD AUTO: 0.1 K/UL (ref 0–0.04)
IMM GRANULOCYTES NFR BLD AUTO: 1 % (ref 0–0.5)
LACTATE SERPL-SCNC: 1.5 MMOL/L (ref 0.4–2)
LYMPHOCYTES # BLD: 0.9 K/UL (ref 0.8–3.5)
LYMPHOCYTES NFR BLD: 5 % (ref 12–49)
MCH RBC QN AUTO: 32.3 PG (ref 26–34)
MCHC RBC AUTO-ENTMCNC: 33.8 G/DL (ref 30–36.5)
MCV RBC AUTO: 95.6 FL (ref 80–99)
MONOCYTES # BLD: 0.7 K/UL (ref 0–1)
MONOCYTES NFR BLD: 4 % (ref 5–13)
NEUTS SEG # BLD: 15.4 K/UL (ref 1.8–8)
NEUTS SEG NFR BLD: 90 % (ref 32–75)
NRBC # BLD: 0 K/UL (ref 0–0.01)
NRBC BLD-RTO: 0 PER 100 WBC
P-R INTERVAL, ECG05: 120 MS
PLATELET # BLD AUTO: 256 K/UL (ref 150–400)
PMV BLD AUTO: 11.1 FL (ref 8.9–12.9)
POTASSIUM SERPL-SCNC: 3.8 MMOL/L (ref 3.5–5.1)
PROT SERPL-MCNC: 8 G/DL (ref 6.4–8.2)
Q-T INTERVAL, ECG07: 360 MS
QRS DURATION, ECG06: 86 MS
QTC CALCULATION (BEZET), ECG08: 435 MS
RBC # BLD AUTO: 5.02 M/UL (ref 4.1–5.7)
SAMPLES BEING HELD,HOLD: NORMAL
SODIUM SERPL-SCNC: 139 MMOL/L (ref 136–145)
VENTRICULAR RATE, ECG03: 88 BPM
WBC # BLD AUTO: 17.1 K/UL (ref 4.1–11.1)

## 2021-11-24 PROCEDURE — 85025 COMPLETE CBC W/AUTO DIFF WBC: CPT

## 2021-11-24 PROCEDURE — 93005 ELECTROCARDIOGRAM TRACING: CPT

## 2021-11-24 PROCEDURE — 96360 HYDRATION IV INFUSION INIT: CPT

## 2021-11-24 PROCEDURE — 99284 EMERGENCY DEPT VISIT MOD MDM: CPT

## 2021-11-24 PROCEDURE — 36415 COLL VENOUS BLD VENIPUNCTURE: CPT

## 2021-11-24 PROCEDURE — 74011250636 HC RX REV CODE- 250/636: Performed by: STUDENT IN AN ORGANIZED HEALTH CARE EDUCATION/TRAINING PROGRAM

## 2021-11-24 PROCEDURE — 80053 COMPREHEN METABOLIC PANEL: CPT

## 2021-11-24 PROCEDURE — 87040 BLOOD CULTURE FOR BACTERIA: CPT

## 2021-11-24 PROCEDURE — 83605 ASSAY OF LACTIC ACID: CPT

## 2021-11-24 RX ORDER — ONDANSETRON 2 MG/ML
4 INJECTION INTRAMUSCULAR; INTRAVENOUS
Status: DISCONTINUED | OUTPATIENT
Start: 2021-11-24 | End: 2021-11-24

## 2021-11-24 RX ORDER — ONDANSETRON 4 MG/1
4 TABLET, ORALLY DISINTEGRATING ORAL
Qty: 20 TABLET | Refills: 0 | Status: SHIPPED | OUTPATIENT
Start: 2021-11-24

## 2021-11-24 RX ADMIN — SODIUM CHLORIDE, POTASSIUM CHLORIDE, SODIUM LACTATE AND CALCIUM CHLORIDE 1000 ML: 600; 310; 30; 20 INJECTION, SOLUTION INTRAVENOUS at 14:49

## 2021-11-24 NOTE — DISCHARGE INSTRUCTIONS
You presented to the ED with nausea vomiting and some hypotension. He recently had a significant illness similar to this in the past.  However this time her symptoms improved greatly while in the waiting room. Small IV fluids were given here in the ED but IV infiltrated and no additional fluids were given. However you are tolerating liquid by mouth well. Prescription for Zofran was written. Recommend close follow-up with your GI doctor. Your leukocytosis as well as your hyperglycemia likely due to stress response from nausea and vomiting. You do not have any other infectious symptoms. However blood cultures were drawn and are currently pending.   Please review these on the electronic medical record

## 2021-11-24 NOTE — ED TRIAGE NOTES
TRIAGE NOTE:  Patient arrives by EMS with c/o nausea and vomiting and some abdominal pain. Patient reports similar to when he was admitted 3 weeks ago.

## 2021-11-25 NOTE — ED PROVIDER NOTES
Patient is a 60-year-old male with a history of alcohol use disorder currently in remission on naltrexone implant who had a significant episode of nausea, vomiting and diarrhea approximately 3 weeks ago that landed in the hospital with a sepsis rule out work-up who was found to have inflammatory versus infectious bowel disease who is being followed by GI for continued work-up who had return of nausea and vomiting today prior to arrival in the ED. Family and patient stated concern due to recent hospitalization for significant illness. However patient stated his nausea started resolving while in the ED waiting room. The history is provided by the patient, medical records and the spouse. Nausea   This is a recurrent problem. The current episode started 3 to 5 hours ago. The problem occurs 2 to 4 times per day. The problem has been rapidly improving. The emesis has an appearance of stomach contents. There has been no fever. Associated symptoms include abdominal pain. Pertinent negatives include no chills, no diarrhea and no myalgias. The patient is not pregnant. Risk factors: Recent illness. His pertinent negatives include no irritable bowel syndrome and no inflammatory bowel disease. Past Medical History:   Diagnosis Date    Alcohol abuse     HLD (hyperlipidemia)     Hypertension        No past surgical history on file.       Family History:   Problem Relation Age of Onset    No Known Problems Mother     Diabetes Father     Heart Disease Father        Social History     Socioeconomic History    Marital status:      Spouse name: Not on file    Number of children: Not on file    Years of education: Not on file    Highest education level: Not on file   Occupational History    Not on file   Tobacco Use    Smoking status: Former Smoker    Smokeless tobacco: Never Used   Substance and Sexual Activity    Alcohol use: Not Currently     Comment: no alochol for past few months     Drug use: Never    Sexual activity: Not on file   Other Topics Concern     Service Not Asked    Blood Transfusions Not Asked    Caffeine Concern Not Asked    Occupational Exposure Not Asked    Hobby Hazards Not Asked    Sleep Concern Not Asked    Stress Concern Not Asked    Weight Concern Not Asked    Special Diet Not Asked    Back Care Not Asked    Exercise Not Asked    Bike Helmet Not Asked   [address] Not Asked    Self-Exams Not Asked   Social History Narrative    Not on file     Social Determinants of Health     Financial Resource Strain:     Difficulty of Paying Living Expenses: Not on file   Food Insecurity:     Worried About Running Out of Food in the Last Year: Not on file    Clement[place] of Food in the Last Year: Not on file   Transportation Needs:     Lack of Transportation (Medical): Not on file    Lack of Transportation (Non-Medical): Not on file   Physical Activity:     Days of Exercise per Week: Not on file    Minutes of Exercise per Session: Not on file   Stress:     Feeling of Stress : Not on file   Social Connections:     Frequency of Communication with Friends and Family: Not on file    Frequency of Social Gatherings with Friends and Family: Not on file    Attends Holiness Services: Not on file    Active Member of [address] [place] [address] [employer] or Organizations: Not on file    Attends Club or Organization Meetings: Not on file    Marital Status: Not on file   Intimate Partner Violence:     Fear of Current or Ex-Partner: Not on file    Emotionally Abused: Not on file    Physically Abused: Not on file    Sexually Abused: Not on file   Housing Stability:     Unable to Pay for Housing in the Last Year: Not on file    Number of [place] in the Last Year: Not on file    Unstable Housing in the Last Year: Not on file         ALLERGIES: Patient has no known allergies. Review of Systems   Constitutional: Negative. Negative for chills. HENT: Negative. Eyes: Negative.     Respiratory: Negative. Cardiovascular: Negative. Gastrointestinal: Positive for abdominal pain and nausea. Negative for diarrhea. Endocrine: Negative. Genitourinary: Negative. Musculoskeletal: Negative. Negative for myalgias. Skin: Negative. Allergic/Immunologic: Negative. Neurological: Negative. Hematological: Negative. Psychiatric/Behavioral: Negative. Vitals:    11/24/21 1248 11/24/21 1500 11/24/21 1600 11/24/21 1630   BP: (!) 88/65 114/78 110/79    Pulse: (!) 110 82 89    Resp: 18 13 15    Temp: 97.2 °F (36.2 °C)      SpO2: 100% 100% 98% 98%            Physical Exam  Vitals and nursing note reviewed. Constitutional:       General: He is not in acute distress. Appearance: Normal appearance. HENT:      Head: Normocephalic and atraumatic. Right Ear: External ear normal.      Left Ear: External ear normal.      Nose: Nose normal.   Eyes:      Extraocular Movements: Extraocular movements intact. Conjunctiva/sclera: Conjunctivae normal.   Cardiovascular:      Rate and Rhythm: Normal rate. Pulses: Normal pulses. Radial pulses are 2+ on the right side and 2+ on the left side. Heart sounds: Normal heart sounds. Pulmonary:      Effort: Pulmonary effort is normal.      Breath sounds: Normal breath sounds. Chest:      Chest wall: No deformity or tenderness. Abdominal:      General: Abdomen is flat. There is no distension. Tenderness: There is no abdominal tenderness. Musculoskeletal:         General: No deformity or signs of injury. Normal range of motion. Cervical back: Normal range of motion and neck supple. No tenderness. Skin:     General: Skin is warm and dry. Capillary Refill: Capillary refill takes less than 2 seconds. Neurological:      General: No focal deficit present. Mental Status: He is alert and oriented to person, place, and time.    Psychiatric:         Attention and Perception: Attention normal.         Mood and Affect: Mood normal.         Behavior: Behavior normal.          [facility]  ED Course as of 11/25/21 0725   Wed Nov 24, 2021   1430 WBC(!): 17.1 [AL]   1434 BP(!): 88/65 [AL]   1444 EKG interpretation:   Rhythm: normal sinus rhythm; and regular . Rate (approx.): 88; Axis: normal; Intervals: normal ; ST/T wave: normal; EKG documented and interpreted by Francoise Siegel. Rocky Marrero MD, Emergency Medicine.     [AL]   1611 Lactic acid: 1.5 [AL]   1614 Glucose(!): 140 [AL]      ED Course User Index  [AL] Mart Jett MD     LABORATORY RESULTS:  Labs Reviewed   CBC WITH AUTOMATED DIFF - Abnormal; Notable for the following components:       Result Value    WBC 17.1 (*)     NEUTROPHILS 90 (*)     LYMPHOCYTES 5 (*)     MONOCYTES 4 (*)     IMMATURE GRANULOCYTES 1 (*)     ABS. NEUTROPHILS 15.4 (*)     ABS. IMM. GRANS. 0.1 (*)     All other components within normal limits   METABOLIC PANEL, COMPREHENSIVE - Abnormal; Notable for the following components:    Glucose 140 (*)     Bilirubin, total 1.1 (*)     All other components within normal limits   CULTURE, BLOOD, PAIRED   LACTIC ACID   SAMPLES BEING HELD       IMAGING RESULTS:  No orders to display       MEDICATIONS GIVEN:  Medications   lactated ringers bolus infusion 1,000 mL (0 mL IntraVENous IV Completed 11/24/21 1600)       Differential diagnosis: Nausea and vomiting, hypotension, systemic infection, inflammatory versus infectious bowel disease      ED physician interpretation of EKG: No STEMI. See my interpretation EKG and ED course above. ED physician interpretation of laboratory results: Nonspecific leukocytosis, hyperglycemia without DKA. Patient's lab work consistent with nausea vomiting stress response. No other significant signs of infection. Patient blood pressure improved with mild IV fluids.     [facility]: Patient is a 52-year-old male with a presumptive diagnosis of infectious/inflammatory bowel disease being followed by GI with a recent hospitalization for nausea vomiting hypotension presented to ED with return of nausea vomiting and a brief period of one episode of hypotension that resolved who most likely has continued sequela of infectious or inflammatory bowel disease her symptoms resolved with mild interventions appropriate for discharge home and continued outpatient follow-up. Patient's vital signs are stable, patient is afebrile. Patient's physical exam is significant for a thin male with no acute distress. Of note patient's IV did infiltrate and he received approximately 400 mL of IV fluids. However during this entire time patient was drinking water without difficulty had no additional episodes of nausea or vomiting while bedded in the ED. Reviewed patient's previous lab work from outpatient studies including celiac studies with the patient and his spouse. They are comfortable with being discharged home. Prescription for Zofran written. Key discharge instructions and summary of care: You presented to the ED with nausea vomiting and some hypotension. He recently had a significant illness similar to this in the past.  However this time her symptoms improved greatly while in the waiting room. Small IV fluids were given here in the ED but IV infiltrated and no additional fluids were given. However you are tolerating liquid by mouth well. Prescription for Zofran was written. Recommend close follow-up with your GI doctor. Your leukocytosis as well as your hyperglycemia likely due to stress response from nausea and vomiting. You do not have any other infectious symptoms. However blood cultures were drawn and are currently pending. Please review these on the electronic medical record    The patient has been re-evaluated and feeling better. Patient is stable for discharge. All available radiology and laboratory results have been reviewed with patient and/or available family.   Patient and/or family verbally conveyed their understanding and agreement of the patient's signs, symptoms, diagnosis, treatment and prognosis and additionally agree to follow-up as recommended in the discharge instructions or to return to the Emergency Department should their condition change or worsen prior to their follow-up appointment. All questions have been answered and patient and/or available family express understanding. IMPRESSION:  1. Non-intractable vomiting with nausea, unspecified vomiting type    2. Leukocytosis, unspecified type        DISPOSITION: Discharged    Harjit Echeverria MD          Procedures

## 2021-11-29 LAB
BACTERIA SPEC CULT: NORMAL
SERVICE CMNT-IMP: NORMAL

## 2021-12-27 ENCOUNTER — HOSPITAL ENCOUNTER (EMERGENCY)
Age: 51
Discharge: HOME | End: 2021-12-27
Attending: STUDENT IN AN ORGANIZED HEALTH CARE EDUCATION/TRAINING PROGRAM
Payer: COMMERCIAL

## 2021-12-27 VITALS
DIASTOLIC BLOOD PRESSURE: 77 MMHG | HEART RATE: 94 BPM | OXYGEN SATURATION: 95 % | TEMPERATURE: 98 F | SYSTOLIC BLOOD PRESSURE: 114 MMHG | RESPIRATION RATE: 18 BRPM

## 2021-12-27 DIAGNOSIS — R11.10 VOMITING AND DIARRHEA: Primary | ICD-10-CM

## 2021-12-27 DIAGNOSIS — I95.9 HYPOTENSION, UNSPECIFIED HYPOTENSION TYPE: ICD-10-CM

## 2021-12-27 DIAGNOSIS — R19.7 VOMITING AND DIARRHEA: Primary | ICD-10-CM

## 2021-12-27 LAB
ALBUMIN SERPL-MCNC: 4.1 G/DL (ref 3.5–5)
ALBUMIN/GLOB SERPL: 1.2 {RATIO} (ref 1.1–2.2)
ALP SERPL-CCNC: 56 U/L (ref 45–117)
ALT SERPL-CCNC: 41 U/L (ref 12–78)
ANION GAP SERPL CALC-SCNC: 8 MMOL/L (ref 5–15)
APPEARANCE UR: CLEAR
AST SERPL-CCNC: 24 U/L (ref 15–37)
BACTERIA URNS QL MICRO: NEGATIVE /HPF
BASOPHILS # BLD: 0 K/UL (ref 0–0.1)
BASOPHILS NFR BLD: 0 % (ref 0–1)
BILIRUB SERPL-MCNC: 0.7 MG/DL (ref 0.2–1)
BILIRUB UR QL: NEGATIVE
BUN SERPL-MCNC: 11 MG/DL (ref 6–20)
BUN/CREAT SERPL: 10 (ref 12–20)
CALCIUM SERPL-MCNC: 9.9 MG/DL (ref 8.5–10.1)
CHLORIDE SERPL-SCNC: 104 MMOL/L (ref 97–108)
CO2 SERPL-SCNC: 25 MMOL/L (ref 21–32)
COLOR UR: ABNORMAL
COMMENT, HOLDF: NORMAL
CREAT SERPL-MCNC: 1.06 MG/DL (ref 0.7–1.3)
DIFFERENTIAL METHOD BLD: ABNORMAL
EOSINOPHIL # BLD: 0 K/UL (ref 0–0.4)
EOSINOPHIL NFR BLD: 0 % (ref 0–7)
EPITH CASTS URNS QL MICRO: ABNORMAL /LPF
ERYTHROCYTE [DISTWIDTH] IN BLOOD BY AUTOMATED COUNT: 12.7 % (ref 11.5–14.5)
GLOBULIN SER CALC-MCNC: 3.5 G/DL (ref 2–4)
GLUCOSE SERPL-MCNC: 155 MG/DL (ref 65–100)
GLUCOSE UR STRIP.AUTO-MCNC: NEGATIVE MG/DL
HCT VFR BLD AUTO: 50.4 % (ref 36.6–50.3)
HGB BLD-MCNC: 16.8 G/DL (ref 12.1–17)
HGB UR QL STRIP: NEGATIVE
IMM GRANULOCYTES # BLD AUTO: 0.2 K/UL (ref 0–0.04)
IMM GRANULOCYTES NFR BLD AUTO: 1 % (ref 0–0.5)
KETONES UR QL STRIP.AUTO: 40 MG/DL
LEUKOCYTE ESTERASE UR QL STRIP.AUTO: NEGATIVE
LIPASE SERPL-CCNC: 76 U/L (ref 73–393)
LYMPHOCYTES # BLD: 0.8 K/UL (ref 0.8–3.5)
LYMPHOCYTES NFR BLD: 4 % (ref 12–49)
MAGNESIUM SERPL-MCNC: 1.8 MG/DL (ref 1.6–2.4)
MCH RBC QN AUTO: 32.4 PG (ref 26–34)
MCHC RBC AUTO-ENTMCNC: 33.3 G/DL (ref 30–36.5)
MCV RBC AUTO: 97.1 FL (ref 80–99)
MONOCYTES # BLD: 0.9 K/UL (ref 0–1)
MONOCYTES NFR BLD: 5 % (ref 5–13)
NEUTS SEG # BLD: 17.1 K/UL (ref 1.8–8)
NEUTS SEG NFR BLD: 90 % (ref 32–75)
NITRITE UR QL STRIP.AUTO: NEGATIVE
NRBC # BLD: 0 K/UL (ref 0–0.01)
NRBC BLD-RTO: 0 PER 100 WBC
PH UR STRIP: 5 [PH] (ref 5–8)
PLATELET # BLD AUTO: 257 K/UL (ref 150–400)
PMV BLD AUTO: 10.7 FL (ref 8.9–12.9)
POTASSIUM SERPL-SCNC: 4 MMOL/L (ref 3.5–5.1)
PROT SERPL-MCNC: 7.6 G/DL (ref 6.4–8.2)
PROT UR STRIP-MCNC: NEGATIVE MG/DL
RBC # BLD AUTO: 5.19 M/UL (ref 4.1–5.7)
RBC #/AREA URNS HPF: ABNORMAL /HPF (ref 0–5)
SAMPLES BEING HELD,HOLD: NORMAL
SODIUM SERPL-SCNC: 137 MMOL/L (ref 136–145)
SP GR UR REFRACTOMETRY: 1.02 (ref 1–1.03)
UR CULT HOLD, URHOLD: NORMAL
UROBILINOGEN UR QL STRIP.AUTO: 0.2 EU/DL (ref 0.2–1)
WBC # BLD AUTO: 19 K/UL (ref 4.1–11.1)
WBC URNS QL MICRO: ABNORMAL /HPF (ref 0–4)

## 2021-12-27 PROCEDURE — 83690 ASSAY OF LIPASE: CPT

## 2021-12-27 PROCEDURE — 96375 TX/PRO/DX INJ NEW DRUG ADDON: CPT

## 2021-12-27 PROCEDURE — 74011250636 HC RX REV CODE- 250/636: Performed by: STUDENT IN AN ORGANIZED HEALTH CARE EDUCATION/TRAINING PROGRAM

## 2021-12-27 PROCEDURE — 96361 HYDRATE IV INFUSION ADD-ON: CPT

## 2021-12-27 PROCEDURE — 83735 ASSAY OF MAGNESIUM: CPT

## 2021-12-27 PROCEDURE — 85025 COMPLETE CBC W/AUTO DIFF WBC: CPT

## 2021-12-27 PROCEDURE — 36415 COLL VENOUS BLD VENIPUNCTURE: CPT

## 2021-12-27 PROCEDURE — 99284 EMERGENCY DEPT VISIT MOD MDM: CPT

## 2021-12-27 PROCEDURE — 96374 THER/PROPH/DIAG INJ IV PUSH: CPT

## 2021-12-27 PROCEDURE — 81001 URINALYSIS AUTO W/SCOPE: CPT

## 2021-12-27 PROCEDURE — 80053 COMPREHEN METABOLIC PANEL: CPT

## 2021-12-27 RX ORDER — SODIUM CHLORIDE 9 MG/ML
2000 INJECTION, SOLUTION INTRAVENOUS ONCE
Status: COMPLETED | OUTPATIENT
Start: 2021-12-27 | End: 2021-12-27

## 2021-12-27 RX ORDER — ONDANSETRON 2 MG/ML
4 INJECTION INTRAMUSCULAR; INTRAVENOUS
Status: COMPLETED | OUTPATIENT
Start: 2021-12-27 | End: 2021-12-27

## 2021-12-27 RX ADMIN — ONDANSETRON 4 MG: 2 INJECTION INTRAMUSCULAR; INTRAVENOUS at 18:42

## 2021-12-27 RX ADMIN — FAMOTIDINE 20 MG: 10 INJECTION INTRAVENOUS at 18:42

## 2021-12-27 RX ADMIN — SODIUM CHLORIDE 2000 ML: 9 INJECTION, SOLUTION INTRAVENOUS at 18:43

## 2021-12-27 NOTE — ED TRIAGE NOTES
TRIAGE NOTE:  Patient arrives by EMS from home with c/o nausea, vomiting and diarrhea that started at 2 pm.  Patient reports took zofran without relief. Patient reports intermittent abdominal pain, denies blood in stool or vomit.

## 2021-12-28 NOTE — ED NOTES
Patient received discharge instructions by MD and RN. Reviewed discharge instructions with patient. Patient verbalized understanding of discharge teaching. Patient left ED ambulatory with family member.

## 2021-12-28 NOTE — ED PROVIDER NOTES
The patient is a 23-year-old male presenting today with nausea, vomiting and diarrhea. This is his fourth or fifth episode of this in the past several months. He is in the process of being worked up for this and actually had an admission in early November for the same. He is due for endoscopy and colonoscopy next week. Reports that around 2 PM today he had sudden onset of nausea, vomiting and diarrhea. He had an unknown number of episodes of vomiting and diarrhea today, none of which had blood in it. He denies any abdominal pain. He reports fatigue. Blood pressure was low in the 80s to 90s when checked at home. He took oral Zofran at home without improvement initially. He is starting to feel a bit better now. Past Medical History:   Diagnosis Date    Alcohol abuse     HLD (hyperlipidemia)     Hypertension        No past surgical history on file.       Family History:   Problem Relation Age of Onset    No Known Problems Mother     Diabetes Father     Heart Disease Father        Social History     Socioeconomic History    Marital status:      Spouse name: Not on file    Number of children: Not on file    Years of education: Not on file    Highest education level: Not on file   Occupational History    Not on file   Tobacco Use    Smoking status: Former Smoker    Smokeless tobacco: Never Used   Substance and Sexual Activity    Alcohol use: Not Currently     Comment: no alochol for past few months     Drug use: Never    Sexual activity: Not on file   Other Topics Concern   [address] Service Not Asked    Blood Transfusions Not Asked    Caffeine Concern Not Asked    Occupational Exposure Not Asked    Hobby Hazards Not Asked    Sleep Concern Not Asked    Stress Concern Not Asked    Weight Concern Not Asked    Special Diet Not Asked    Back Care Not Asked    Exercise Not Asked    Bike Helmet Not Asked   [place] Not Asked    Self-Exams Not Asked   Social History Narrative    Not on file     Social Determinants of Health     Financial Resource Strain:     Difficulty of Paying Living Expenses: Not on file   Food Insecurity:     Worried About Running Out of Food in the Last Year: Not on file    Clementina of Food in the Last Year: Not on file   Transportation Needs:     Lack of Transportation (Medical): Not on file    Lack of Transportation (Non-Medical): Not on file   Physical Activity:     Days of Exercise per Week: Not on file    Minutes of Exercise per Session: Not on file   Stress:     Feeling of Stress : Not on file   Social Connections:     Frequency of Communication with Friends and Family: Not on file    Frequency of Social Gatherings with Friends and Family: Not on file    Attends Holiness Services: Not on file    Active Member of [address] [place] [address] [employer] or Organizations: Not on file    Attends Club or Organization Meetings: Not on file    Marital Status: Not on file   Intimate Partner Violence:     Fear of Current or Ex-Partner: Not on file    Emotionally Abused: Not on file    Physically Abused: Not on file    Sexually Abused: Not on file   Housing Stability:     Unable to Pay for Housing in the Last Year: Not on file    Number of [place] in the Last Year: Not on file    Unstable Housing in the Last Year: Not on file         ALLERGIES: Patient has no known allergies. Review of Systems   Constitutional: Positive for fatigue. Negative for chills and fever. HENT: Negative for congestion and sore throat. Eyes: Negative for pain and redness. Respiratory: Negative for cough and shortness of breath. Cardiovascular: Negative for chest pain and palpitations. Gastrointestinal: Positive for diarrhea, nausea and vomiting. Negative for abdominal pain. Genitourinary: Negative for frequency and hematuria. Musculoskeletal: Negative for back pain and neck pain. Skin: Negative for rash and wound. Neurological: Negative for dizziness and headaches. Hematological: Does not bruise/bleed easily. Vitals:    12/27/21 1819   BP: 109/69   Pulse: 98   Resp: 18   Temp: 98.2 °F (36.8 °C)   SpO2: 96%            Physical Exam  Vitals and nursing note reviewed. Constitutional:       General: He is not in acute distress. Appearance: He is well-developed. HENT:      Head: Normocephalic and atraumatic. Mouth/Throat:      Mouth: Mucous membranes are dry. Eyes:      Conjunctiva/sclera: Conjunctivae normal.      Pupils: Pupils are equal, round, and reactive to light. Cardiovascular:      Rate and Rhythm: Normal rate and regular rhythm. Heart sounds: Normal heart sounds. No murmur heard. No friction rub. No gallop. Comments: Equal radial, dp, and pt pulses  Pulmonary:      Effort: Pulmonary effort is normal. No respiratory distress. Breath sounds: Normal breath sounds. No wheezing or rales. Abdominal:      General: Bowel sounds are normal. There is no distension. Palpations: Abdomen is soft. Tenderness: There is no abdominal tenderness. There is no guarding or rebound. Musculoskeletal:         General: Normal range of motion. Cervical back: Normal range of motion and neck supple. Skin:     General: Skin is warm and dry. Capillary Refill: Capillary refill takes less than 2 seconds. Findings: No rash. Neurological:      Mental Status: He is alert and oriented to person, place, and time. MDM       Procedures      Labs  Leukocytosis  No anemia  Lytes, lipase and renal function ok    On re-eval after pepcid/zofran and IVF pt feeling much better  Able to drink water and then ginger ale  Discussed results with pt and wife. Questions answered    49 y.o. male presents today with nvd x several hours. This is a recurrent problem he is having that is being worked up by GI.  No abdominal tenderness and very similar presentation to prior episodes therefore after shared decision making with pt/wife we elected to hold off on imaging. Pt improved with tx as above. Noted to be hypotensive with EMS however bp and other vs stable here. Marcie Dey for [employer] home. ICD-10-CM ICD-9-CM    1. Vomiting and diarrhea  R11.10 787.03     R19.7 787.91    2.  Hypotension, unspecified hypotension type  I95.9 458.9      DC  Frank Gaines, DO

## 2021-12-28 NOTE — ED NOTES
Verbal shift change report given to [address] [place] [address] (oncoming nurse) by Nestor Torrez RN (offgoing nurse). Report included the following information SBAR, ED Summary and MAR.

## 2022-03-19 PROBLEM — I10 HTN (HYPERTENSION), BENIGN: Status: ACTIVE | Noted: 2021-11-03

## 2022-03-19 PROBLEM — R19.7 DIARRHEA: Status: ACTIVE | Noted: 2021-11-01

## 2022-03-19 PROBLEM — K52.9 ENTERITIS: Status: ACTIVE | Noted: 2021-11-01

## 2022-03-19 PROBLEM — E78.5 DYSLIPIDEMIA: Status: ACTIVE | Noted: 2021-11-02

## 2022-03-20 PROBLEM — R10.9 ACUTE ABDOMINAL PAIN: Status: ACTIVE | Noted: 2021-11-01

## 2023-05-15 RX ORDER — ONDANSETRON 4 MG/1
4 TABLET, ORALLY DISINTEGRATING ORAL EVERY 8 HOURS PRN
COMMUNITY
Start: 2021-11-24

## 2023-05-15 RX ORDER — ATORVASTATIN CALCIUM 40 MG/1
40 TABLET, FILM COATED ORAL DAILY
COMMUNITY

## 2023-05-15 RX ORDER — LISINOPRIL AND HYDROCHLOROTHIAZIDE 20; 12.5 MG/1; MG/1
1 TABLET ORAL DAILY
COMMUNITY